# Patient Record
Sex: MALE | Race: OTHER | Employment: OTHER | URBAN - METROPOLITAN AREA
[De-identification: names, ages, dates, MRNs, and addresses within clinical notes are randomized per-mention and may not be internally consistent; named-entity substitution may affect disease eponyms.]

---

## 2018-11-20 LAB
ANION GAP SERPL CALCULATED.3IONS-SCNC: 11 MEQ/L (ref 7–13)
BUN BLDV-MCNC: 22 MG/DL (ref 8–23)
CHLORIDE BLD-SCNC: 104 MEQ/L (ref 98–107)
CHOLESTEROL, TOTAL: 168 MG/DL (ref 0–199)
CO2: 28 MEQ/L (ref 22–29)
CREAT SERPL-MCNC: 1.16 MG/DL (ref 0.7–1.2)
GFR AFRICAN AMERICAN: >60
GFR NON-AFRICAN AMERICAN: >60
HDLC SERPL-MCNC: 50 MG/DL (ref 40–59)
LDL CHOLESTEROL CALCULATED: 89 MG/DL (ref 0–129)
POTASSIUM SERPL-SCNC: 4.4 MEQ/L (ref 3.5–5.1)
SODIUM BLD-SCNC: 143 MEQ/L (ref 132–144)
TOTAL CK: 163 U/L (ref 0–190)
TRIGL SERPL-MCNC: 145 MG/DL (ref 0–200)

## 2019-11-08 LAB
ANION GAP SERPL CALCULATED.3IONS-SCNC: 15 MMOL/L (ref 10–20)
BICARBONATE: 28 MMOL/L (ref 21–32)
CHLORIDE BLD-SCNC: 100 MMOL/L (ref 98–107)
CHOLESTEROL/HDL RATIO: 2.8
CHOLESTEROL: 171 MG/DL (ref 0–199)
CK ISOENZYMES: 94 U/L (ref 0–325)
CREAT SERPL-MCNC: 1.17 MG/DL (ref 0.5–1.3)
GFR AFRICAN AMERICAN: >60 ML/MIN/1.73M2
GFR NON-AFRICAN AMERICAN: >60 ML/MIN/1.73M2
HDLC SERPL-MCNC: 61 MG/DL
LDL CHOLESTEROL: 89 MG/DL (ref 0–99)
POTASSIUM SERPL-SCNC: 4.1 MMOL/L (ref 3.5–5.3)
SODIUM BLD-SCNC: 139 MMOL/L (ref 136–145)
TRIGL SERPL-MCNC: 106 MG/DL (ref 0–149)
UREA NITROGEN: 22 MG/DL (ref 6–23)
VLDLC SERPL CALC-MCNC: 21 MG/DL (ref 0–40)

## 2020-01-13 LAB
CHOLESTEROL/HDL RATIO: 3.5
CHOLESTEROL: 150 MG/DL (ref 0–199)
CK ISOENZYMES: 113 U/L (ref 0–325)
HDLC SERPL-MCNC: 43 MG/DL
LDL CHOLESTEROL: 77 MG/DL (ref 0–99)
TRIGL SERPL-MCNC: 150 MG/DL (ref 0–149)
VLDLC SERPL CALC-MCNC: 30 MG/DL (ref 0–40)

## 2021-05-29 ENCOUNTER — HOSPITAL ENCOUNTER (INPATIENT)
Age: 76
LOS: 1 days | Discharge: ANOTHER HOSPITAL | DRG: 272 | End: 2021-05-29
Attending: EMERGENCY MEDICINE | Admitting: INTERNAL MEDICINE
Payer: MEDICARE

## 2021-05-29 ENCOUNTER — APPOINTMENT (OUTPATIENT)
Dept: CT IMAGING | Age: 76
DRG: 272 | End: 2021-05-29
Payer: MEDICARE

## 2021-05-29 VITALS
HEIGHT: 68 IN | WEIGHT: 178 LBS | SYSTOLIC BLOOD PRESSURE: 125 MMHG | HEART RATE: 60 BPM | DIASTOLIC BLOOD PRESSURE: 58 MMHG | BODY MASS INDEX: 26.98 KG/M2 | RESPIRATION RATE: 9 BRPM | TEMPERATURE: 98.2 F | OXYGEN SATURATION: 100 %

## 2021-05-29 DIAGNOSIS — I21.4 NSTEMI (NON-ST ELEVATED MYOCARDIAL INFARCTION) (HCC): Primary | ICD-10-CM

## 2021-05-29 LAB
ALBUMIN SERPL-MCNC: 4.9 G/DL (ref 3.5–4.6)
ALP BLD-CCNC: 87 U/L (ref 35–104)
ALT SERPL-CCNC: 39 U/L (ref 0–41)
ANION GAP SERPL CALCULATED.3IONS-SCNC: 11 MEQ/L (ref 9–15)
APTT: 26.7 SEC (ref 24.4–36.8)
AST SERPL-CCNC: 41 U/L (ref 0–40)
BASOPHILS ABSOLUTE: 0 K/UL (ref 0–0.2)
BASOPHILS RELATIVE PERCENT: 0.6 %
BILIRUB SERPL-MCNC: 0.7 MG/DL (ref 0.2–0.7)
BUN BLDV-MCNC: 18 MG/DL (ref 8–23)
CALCIUM SERPL-MCNC: 9.6 MG/DL (ref 8.5–9.9)
CHLORIDE BLD-SCNC: 96 MEQ/L (ref 95–107)
CO2: 28 MEQ/L (ref 20–31)
CREAT SERPL-MCNC: 1.03 MG/DL (ref 0.7–1.2)
EOSINOPHILS ABSOLUTE: 0.1 K/UL (ref 0–0.7)
EOSINOPHILS RELATIVE PERCENT: 1 %
GFR AFRICAN AMERICAN: >60
GFR NON-AFRICAN AMERICAN: >60
GLOBULIN: 2.8 G/DL (ref 2.3–3.5)
GLUCOSE BLD-MCNC: 134 MG/DL (ref 70–99)
HCT VFR BLD CALC: 42.7 % (ref 42–52)
HEMOGLOBIN: 14.5 G/DL (ref 14–18)
INR BLD: 1.1
LYMPHOCYTES ABSOLUTE: 1.4 K/UL (ref 1–4.8)
LYMPHOCYTES RELATIVE PERCENT: 23.4 %
MAGNESIUM: 1.9 MG/DL (ref 1.7–2.4)
MCH RBC QN AUTO: 31.4 PG (ref 27–31.3)
MCHC RBC AUTO-ENTMCNC: 33.9 % (ref 33–37)
MCV RBC AUTO: 92.5 FL (ref 80–100)
MONOCYTES ABSOLUTE: 0.6 K/UL (ref 0.2–0.8)
MONOCYTES RELATIVE PERCENT: 9.4 %
NEUTROPHILS ABSOLUTE: 3.9 K/UL (ref 1.4–6.5)
NEUTROPHILS RELATIVE PERCENT: 65.6 %
PDW BLD-RTO: 12.9 % (ref 11.5–14.5)
PLATELET # BLD: 197 K/UL (ref 130–400)
POC CREATININE WHOLE BLOOD: 1.1
POTASSIUM SERPL-SCNC: 3.8 MEQ/L (ref 3.4–4.9)
PROTHROMBIN TIME: 13.8 SEC (ref 12.3–14.9)
RBC # BLD: 4.62 M/UL (ref 4.7–6.1)
SODIUM BLD-SCNC: 135 MEQ/L (ref 135–144)
TOTAL PROTEIN: 7.7 G/DL (ref 6.3–8)
TROPONIN: 0.1 NG/ML (ref 0–0.01)
WBC # BLD: 6 K/UL (ref 4.8–10.8)

## 2021-05-29 PROCEDURE — 2500000003 HC RX 250 WO HCPCS: Performed by: EMERGENCY MEDICINE

## 2021-05-29 PROCEDURE — 6360000002 HC RX W HCPCS: Performed by: INTERNAL MEDICINE

## 2021-05-29 PROCEDURE — 2000000000 HC ICU R&B

## 2021-05-29 PROCEDURE — B41C1ZZ FLUOROSCOPY OF PELVIC ARTERIES USING LOW OSMOLAR CONTRAST: ICD-10-PCS | Performed by: INTERNAL MEDICINE

## 2021-05-29 PROCEDURE — B2111ZZ FLUOROSCOPY OF MULTIPLE CORONARY ARTERIES USING LOW OSMOLAR CONTRAST: ICD-10-PCS | Performed by: INTERNAL MEDICINE

## 2021-05-29 PROCEDURE — 36415 COLL VENOUS BLD VENIPUNCTURE: CPT

## 2021-05-29 PROCEDURE — 2709999900 HC NON-CHARGEABLE SUPPLY

## 2021-05-29 PROCEDURE — C1894 INTRO/SHEATH, NON-LASER: HCPCS

## 2021-05-29 PROCEDURE — C1769 GUIDE WIRE: HCPCS

## 2021-05-29 PROCEDURE — 6360000004 HC RX CONTRAST MEDICATION: Performed by: EMERGENCY MEDICINE

## 2021-05-29 PROCEDURE — C1887 CATHETER, GUIDING: HCPCS

## 2021-05-29 PROCEDURE — 2580000003 HC RX 258

## 2021-05-29 PROCEDURE — C1889 IMPLANT/INSERT DEVICE, NOC: HCPCS

## 2021-05-29 PROCEDURE — 83735 ASSAY OF MAGNESIUM: CPT

## 2021-05-29 PROCEDURE — 96375 TX/PRO/DX INJ NEW DRUG ADDON: CPT

## 2021-05-29 PROCEDURE — 85730 THROMBOPLASTIN TIME PARTIAL: CPT

## 2021-05-29 PROCEDURE — 84484 ASSAY OF TROPONIN QUANT: CPT

## 2021-05-29 PROCEDURE — B41F1ZZ FLUOROSCOPY OF RIGHT LOWER EXTREMITY ARTERIES USING LOW OSMOLAR CONTRAST: ICD-10-PCS | Performed by: INTERNAL MEDICINE

## 2021-05-29 PROCEDURE — 92920 PRQ TRLUML C ANGIOP 1ART&/BR: CPT | Performed by: INTERNAL MEDICINE

## 2021-05-29 PROCEDURE — 6370000000 HC RX 637 (ALT 250 FOR IP): Performed by: EMERGENCY MEDICINE

## 2021-05-29 PROCEDURE — 6360000004 HC RX CONTRAST MEDICATION: Performed by: INTERNAL MEDICINE

## 2021-05-29 PROCEDURE — 4A023N7 MEASUREMENT OF CARDIAC SAMPLING AND PRESSURE, LEFT HEART, PERCUTANEOUS APPROACH: ICD-10-PCS | Performed by: INTERNAL MEDICINE

## 2021-05-29 PROCEDURE — 33967 INSERT I-AORT PERCUT DEVICE: CPT | Performed by: INTERNAL MEDICINE

## 2021-05-29 PROCEDURE — 96374 THER/PROPH/DIAG INJ IV PUSH: CPT

## 2021-05-29 PROCEDURE — 5A02210 ASSISTANCE WITH CARDIAC OUTPUT USING BALLOON PUMP, CONTINUOUS: ICD-10-PCS | Performed by: INTERNAL MEDICINE

## 2021-05-29 PROCEDURE — B4101ZZ FLUOROSCOPY OF ABDOMINAL AORTA USING LOW OSMOLAR CONTRAST: ICD-10-PCS | Performed by: INTERNAL MEDICINE

## 2021-05-29 PROCEDURE — 2580000003 HC RX 258: Performed by: EMERGENCY MEDICINE

## 2021-05-29 PROCEDURE — 85025 COMPLETE CBC W/AUTO DIFF WBC: CPT

## 2021-05-29 PROCEDURE — 80053 COMPREHEN METABOLIC PANEL: CPT

## 2021-05-29 PROCEDURE — 6360000002 HC RX W HCPCS

## 2021-05-29 PROCEDURE — B2151ZZ FLUOROSCOPY OF LEFT HEART USING LOW OSMOLAR CONTRAST: ICD-10-PCS | Performed by: INTERNAL MEDICINE

## 2021-05-29 PROCEDURE — 99285 EMERGENCY DEPT VISIT HI MDM: CPT

## 2021-05-29 PROCEDURE — 6360000002 HC RX W HCPCS: Performed by: EMERGENCY MEDICINE

## 2021-05-29 PROCEDURE — 85610 PROTHROMBIN TIME: CPT

## 2021-05-29 PROCEDURE — 93005 ELECTROCARDIOGRAM TRACING: CPT

## 2021-05-29 PROCEDURE — 93458 L HRT ARTERY/VENTRICLE ANGIO: CPT | Performed by: INTERNAL MEDICINE

## 2021-05-29 PROCEDURE — C1725 CATH, TRANSLUMIN NON-LASER: HCPCS

## 2021-05-29 PROCEDURE — 96372 THER/PROPH/DIAG INJ SC/IM: CPT

## 2021-05-29 PROCEDURE — 71275 CT ANGIOGRAPHY CHEST: CPT

## 2021-05-29 RX ORDER — SODIUM CHLORIDE 0.9 % (FLUSH) 0.9 %
5-40 SYRINGE (ML) INJECTION EVERY 12 HOURS SCHEDULED
Status: DISCONTINUED | OUTPATIENT
Start: 2021-05-29 | End: 2021-05-29 | Stop reason: HOSPADM

## 2021-05-29 RX ORDER — 0.9 % SODIUM CHLORIDE 0.9 %
1000 INTRAVENOUS SOLUTION INTRAVENOUS ONCE
Status: COMPLETED | OUTPATIENT
Start: 2021-05-29 | End: 2021-05-29

## 2021-05-29 RX ORDER — SODIUM CHLORIDE 9 MG/ML
25 INJECTION, SOLUTION INTRAVENOUS PRN
Status: DISCONTINUED | OUTPATIENT
Start: 2021-05-29 | End: 2021-05-29 | Stop reason: HOSPADM

## 2021-05-29 RX ORDER — ATORVASTATIN CALCIUM 80 MG/1
80 TABLET, FILM COATED ORAL NIGHTLY
Status: DISCONTINUED | OUTPATIENT
Start: 2021-05-29 | End: 2021-05-29 | Stop reason: SDUPTHER

## 2021-05-29 RX ORDER — SODIUM CHLORIDE 0.9 % (FLUSH) 0.9 %
5-40 SYRINGE (ML) INJECTION PRN
Status: DISCONTINUED | OUTPATIENT
Start: 2021-05-29 | End: 2021-05-29 | Stop reason: HOSPADM

## 2021-05-29 RX ORDER — HYDRALAZINE HYDROCHLORIDE 20 MG/ML
10 INJECTION INTRAMUSCULAR; INTRAVENOUS EVERY 10 MIN PRN
Status: DISCONTINUED | OUTPATIENT
Start: 2021-05-29 | End: 2021-05-29 | Stop reason: HOSPADM

## 2021-05-29 RX ORDER — ATROPINE SULFATE 0.4 MG/ML
0.6 AMPUL (ML) INJECTION
Status: DISCONTINUED | OUTPATIENT
Start: 2021-05-29 | End: 2021-05-29 | Stop reason: HOSPADM

## 2021-05-29 RX ORDER — ASPIRIN 81 MG/1
324 TABLET, CHEWABLE ORAL ONCE
Status: COMPLETED | OUTPATIENT
Start: 2021-05-29 | End: 2021-05-29

## 2021-05-29 RX ORDER — ACETAMINOPHEN 650 MG/1
650 SUPPOSITORY RECTAL EVERY 6 HOURS PRN
Status: DISCONTINUED | OUTPATIENT
Start: 2021-05-29 | End: 2021-05-29 | Stop reason: HOSPADM

## 2021-05-29 RX ORDER — SODIUM CHLORIDE 9 MG/ML
INJECTION, SOLUTION INTRAVENOUS CONTINUOUS
Status: DISCONTINUED | OUTPATIENT
Start: 2021-05-29 | End: 2021-05-29 | Stop reason: HOSPADM

## 2021-05-29 RX ORDER — ONDANSETRON 2 MG/ML
4 INJECTION INTRAMUSCULAR; INTRAVENOUS ONCE
Status: COMPLETED | OUTPATIENT
Start: 2021-05-29 | End: 2021-05-29

## 2021-05-29 RX ORDER — ACETAMINOPHEN 325 MG/1
650 TABLET ORAL EVERY 4 HOURS PRN
Status: DISCONTINUED | OUTPATIENT
Start: 2021-05-29 | End: 2021-05-29 | Stop reason: SDUPTHER

## 2021-05-29 RX ORDER — ONDANSETRON 2 MG/ML
4 INJECTION INTRAMUSCULAR; INTRAVENOUS EVERY 6 HOURS PRN
Status: DISCONTINUED | OUTPATIENT
Start: 2021-05-29 | End: 2021-05-29 | Stop reason: SDUPTHER

## 2021-05-29 RX ORDER — MORPHINE SULFATE 2 MG/ML
4 INJECTION, SOLUTION INTRAMUSCULAR; INTRAVENOUS
Status: COMPLETED | OUTPATIENT
Start: 2021-05-29 | End: 2021-05-29

## 2021-05-29 RX ORDER — SODIUM CHLORIDE 9 MG/ML
INJECTION, SOLUTION INTRAVENOUS
Status: COMPLETED
Start: 2021-05-29 | End: 2021-05-29

## 2021-05-29 RX ORDER — ASPIRIN 81 MG/1
81 TABLET, CHEWABLE ORAL DAILY
Status: DISCONTINUED | OUTPATIENT
Start: 2021-05-30 | End: 2021-05-29 | Stop reason: HOSPADM

## 2021-05-29 RX ORDER — ASPIRIN 81 MG/1
81 TABLET ORAL DAILY
Status: DISCONTINUED | OUTPATIENT
Start: 2021-05-30 | End: 2021-05-29 | Stop reason: SDUPTHER

## 2021-05-29 RX ORDER — HEPARIN SODIUM 10000 [USP'U]/100ML
6 INJECTION, SOLUTION INTRAVENOUS CONTINUOUS
Status: DISCONTINUED | OUTPATIENT
Start: 2021-05-29 | End: 2021-05-29 | Stop reason: HOSPADM

## 2021-05-29 RX ORDER — ONDANSETRON 2 MG/ML
4 INJECTION INTRAMUSCULAR; INTRAVENOUS EVERY 6 HOURS PRN
Status: DISCONTINUED | OUTPATIENT
Start: 2021-05-29 | End: 2021-05-29 | Stop reason: HOSPADM

## 2021-05-29 RX ORDER — POLYETHYLENE GLYCOL 3350 17 G/17G
17 POWDER, FOR SOLUTION ORAL DAILY PRN
Status: DISCONTINUED | OUTPATIENT
Start: 2021-05-29 | End: 2021-05-29 | Stop reason: HOSPADM

## 2021-05-29 RX ORDER — SODIUM CHLORIDE 9 MG/ML
10 INJECTION INTRAVENOUS DAILY
Status: DISCONTINUED | OUTPATIENT
Start: 2021-05-29 | End: 2021-05-29 | Stop reason: HOSPADM

## 2021-05-29 RX ORDER — TAMSULOSIN HYDROCHLORIDE 0.4 MG/1
0.4 CAPSULE ORAL DAILY
Status: DISCONTINUED | OUTPATIENT
Start: 2021-05-29 | End: 2021-05-29 | Stop reason: HOSPADM

## 2021-05-29 RX ORDER — KETOROLAC TROMETHAMINE 15 MG/ML
15 INJECTION, SOLUTION INTRAMUSCULAR; INTRAVENOUS ONCE
Status: COMPLETED | OUTPATIENT
Start: 2021-05-29 | End: 2021-05-29

## 2021-05-29 RX ORDER — 0.9 % SODIUM CHLORIDE 0.9 %
500 INTRAVENOUS SOLUTION INTRAVENOUS ONCE
Status: DISCONTINUED | OUTPATIENT
Start: 2021-05-29 | End: 2021-05-29 | Stop reason: HOSPADM

## 2021-05-29 RX ORDER — PANTOPRAZOLE SODIUM 40 MG/10ML
40 INJECTION, POWDER, LYOPHILIZED, FOR SOLUTION INTRAVENOUS DAILY
Status: DISCONTINUED | OUTPATIENT
Start: 2021-05-29 | End: 2021-05-29 | Stop reason: HOSPADM

## 2021-05-29 RX ORDER — ACETAMINOPHEN 325 MG/1
650 TABLET ORAL EVERY 6 HOURS PRN
Status: DISCONTINUED | OUTPATIENT
Start: 2021-05-29 | End: 2021-05-29 | Stop reason: HOSPADM

## 2021-05-29 RX ORDER — PROMETHAZINE HYDROCHLORIDE 12.5 MG/1
12.5 TABLET ORAL EVERY 6 HOURS PRN
Status: DISCONTINUED | OUTPATIENT
Start: 2021-05-29 | End: 2021-05-29 | Stop reason: HOSPADM

## 2021-05-29 RX ORDER — NITROGLYCERIN 20 MG/100ML
5-200 INJECTION INTRAVENOUS CONTINUOUS
Status: DISCONTINUED | OUTPATIENT
Start: 2021-05-29 | End: 2021-05-29 | Stop reason: HOSPADM

## 2021-05-29 RX ORDER — ATORVASTATIN CALCIUM 80 MG/1
80 TABLET, FILM COATED ORAL NIGHTLY
Status: DISCONTINUED | OUTPATIENT
Start: 2021-05-29 | End: 2021-05-29 | Stop reason: HOSPADM

## 2021-05-29 RX ADMIN — MORPHINE SULFATE 4 MG: 2 INJECTION, SOLUTION INTRAMUSCULAR; INTRAVENOUS at 10:21

## 2021-05-29 RX ADMIN — ASPIRIN 324 MG: 81 TABLET, CHEWABLE ORAL at 10:21

## 2021-05-29 RX ADMIN — ONDANSETRON 4 MG: 2 INJECTION INTRAMUSCULAR; INTRAVENOUS at 09:38

## 2021-05-29 RX ADMIN — HEPARIN SODIUM AND DEXTROSE 500 UNITS/HR: 10000; 5 INJECTION INTRAVENOUS at 16:39

## 2021-05-29 RX ADMIN — SODIUM CHLORIDE: 9 INJECTION, SOLUTION INTRAVENOUS at 16:38

## 2021-05-29 RX ADMIN — SODIUM CHLORIDE 1000 ML: 9 INJECTION, SOLUTION INTRAVENOUS at 11:22

## 2021-05-29 RX ADMIN — KETOROLAC TROMETHAMINE 15 MG: 15 INJECTION, SOLUTION INTRAMUSCULAR; INTRAVENOUS at 09:38

## 2021-05-29 RX ADMIN — NITROGLYCERIN 30 MCG/MIN: 20 INJECTION INTRAVENOUS at 11:43

## 2021-05-29 RX ADMIN — IOPAMIDOL 340 ML: 612 INJECTION, SOLUTION INTRAVENOUS at 14:32

## 2021-05-29 RX ADMIN — ENOXAPARIN SODIUM 80 MG: 80 INJECTION SUBCUTANEOUS at 10:21

## 2021-05-29 RX ADMIN — SODIUM CHLORIDE 1000 ML: 9 INJECTION, SOLUTION INTRAVENOUS at 09:38

## 2021-05-29 RX ADMIN — IOPAMIDOL 100 ML: 755 INJECTION, SOLUTION INTRAVENOUS at 10:14

## 2021-05-29 RX ADMIN — MORPHINE SULFATE 4 MG: 2 INJECTION, SOLUTION INTRAMUSCULAR; INTRAVENOUS at 16:35

## 2021-05-29 ASSESSMENT — PAIN DESCRIPTION - DIRECTION: RADIATING_TOWARDS: LEFT ARM

## 2021-05-29 ASSESSMENT — PAIN SCALES - GENERAL
PAINLEVEL_OUTOF10: 5
PAINLEVEL_OUTOF10: 4
PAINLEVEL_OUTOF10: 2
PAINLEVEL_OUTOF10: 5

## 2021-05-29 ASSESSMENT — PAIN DESCRIPTION - ORIENTATION
ORIENTATION: LEFT
ORIENTATION: LEFT
ORIENTATION: LEFT;RIGHT
ORIENTATION: LEFT

## 2021-05-29 ASSESSMENT — PAIN DESCRIPTION - LOCATION
LOCATION: CHEST;ARM
LOCATION: CHEST

## 2021-05-29 ASSESSMENT — PAIN DESCRIPTION - FREQUENCY
FREQUENCY: INTERMITTENT
FREQUENCY: CONTINUOUS
FREQUENCY: CONTINUOUS

## 2021-05-29 ASSESSMENT — ENCOUNTER SYMPTOMS
DIARRHEA: 0
NAUSEA: 0
BACK PAIN: 0
SORE THROAT: 0
COUGH: 0
VOMITING: 0
ABDOMINAL PAIN: 0
SHORTNESS OF BREATH: 0

## 2021-05-29 ASSESSMENT — PAIN DESCRIPTION - PAIN TYPE
TYPE: ACUTE PAIN

## 2021-05-29 ASSESSMENT — PAIN DESCRIPTION - DESCRIPTORS
DESCRIPTORS: BURNING
DESCRIPTORS: BURNING;CRUSHING

## 2021-05-29 ASSESSMENT — PAIN DESCRIPTION - ONSET: ONSET: ON-GOING

## 2021-05-29 NOTE — PROGRESS NOTES
I reviewed the notes by Steven Tong RN. I disagree with some of the aspects regarding the timing and details  that she has on her note dated 5/29/2021-5 :25 pm.    The first message that I received   call the ICU was timed 4:03 PM that was the first message I have on my phone and I have it. Do not know how anyone tried to reach me before that . I am told that when the  Ana Frederick ( who I spoke to )asked the  to page me she was told that I was not on call. This is a confusing issue because the  does not have me on call for cardiology but I am on interventional call for Dr. Nick Paredes. I do not know what time lag happened as a result. I was not told that the patient was having chest pain. The patient was comfortable in bed, hemodynamically stable. I did not just return the call I came back to see the patient in his room. I have been on constant communication with the transfer line regarding this patient's transfer. Please do not document without verifying what happened . Also if there was any issues regarding getting a hold of me please inform me at the right time i.e. when I was in the room.

## 2021-05-29 NOTE — PROGRESS NOTES
Spiritual Care Services     Summary of Visit:  Responded to rapid response. Patient being transferred to [facility]. Family in the waiting room, came in one at a time to see their loved one, provided prayer. Patient expressed uncertainty about undergoing surgery. Patent and family open to talking with care team at [facility] and to their recommendations, transfer proceeding. Spiritual Assessment/Intervention/Outcomes:    Encounter Summary  Services provided to[de-identified] Patient and family together  Referral/Consult From[de-identified] Multi-disciplinary team  Support System: Spouse, Children, Family members, Yazidi/nadia community  Continue Visiting: No  Complexity of Encounter: Moderate  Length of Encounter: 45 minutes  Spiritual Assessment Completed: Yes     Crisis  Type: Rapid response  Assessment: Approachable, Anxious  Intervention: Prayer, Explored coping resources, Sustaining presence/ Ministry of presence, Mediation  Outcome: Expressed gratitude, Coping           Advance Directives (For Healthcare)  Pre-existing DNR Comfort Care/DNR Arrest/DNI Order: No                Care Plan:    No follow-up    Spiritual Care Services   Electronically signed by Alan Han on 5/29/21 at 5:23 PM EDT     To reach a  for emotional and spiritual support, place [facility].   If a  is needed immediately, dial 0 and ask to page the on-call .

## 2021-05-29 NOTE — CODE DOCUMENTATION
Transfer line was contacted by nursing supervisor to attempt to expedite transfer to [facility]. EKG shows STEMI with increase in ST elevation and rapid was called because RN could not initially get ahold of Dr. Lisandro Spears who is here in room now discussing plan of care with RN.

## 2021-05-29 NOTE — OP NOTE
INDICATIONS:  The patient is a 67 y.o. male presented with unstable angina pectoris, borderline blood pressure with IV nitroglycerin, persistent chest discomfort, initial troponin was elevated, plan it was felt that patient needed emergent cardiac catheterization and possible intervention. PROCEDURE:  Left heart catheterization, coronary angiography, left ventriculography ,LV/Ao pull back and selective right common femoral angiography was performed. Thoracic and abdominal aortography and aortoiliac angiography was performed. PCI of the proximal mid RCA was attempted, but was unsuccessful. Intra-aortic balloon pump was placed. Patient was transferred to the cardiac intensive care unit after multiple phone calls were made with the transfer center and cardiothoracic surgery  [facility] and [facility], with plans to transfer patient to [facility]. Benefits, alternatives and risks of the procedure were explained to the patient to include but not limited to MI, Stroke, Death, Allergic reaction to dye, bleeding, risk of kidney injury, and possible need for emergent coronary artery bypass grafting and informed consent was obtained. The patient was brought to the cath lab and was prepped and draped in the normal sterile technique. IV conscious sedation was maintained. 1% lidocaine was used for local anesthesia. DESCRIPTION OF PROCEDURE: Under local anesthesia, a 5-Austrian short sheath was placed in the right common femoral artery by single anterior percutaneous stick. Selective right common femoral angiography showed that the access was in the right common femoral artery above its bifurcation. A 5-Austrian JL4 diagnostic catheter was advanced over a 0.035 guidewire and the left main coronary artery was selectively engaged. Angiography of the left system was performed in multiple orthogonal views. The 5-Austrian JL4 diagnostic catheter and the guidewire were removed.  A 5-Austrian 3DRC diagnostic catheter was advanced over a 0.035 guidewire and the right coronary artery was selectively engaged. Angiography of the right coronary artery was performed in multiple orthogonal views. The 5-Nepalese [facility] diagnostic catheter and guidewire were removed. A 5-Nepalese angled pigtail catheter was advanced over a 0.035 guidewire across the aortic valve into the left ventricle. Left ventricular end-diastolic pressure was measured. Left ventriculography was performed in about 30° KC view. Slow manual pullback was performed across the aortic valve. At the conclusion of the procedure, it was decided to proceed with PCI and stenting of the proximal mid RCA artery. The existing 5 Nepalese right femoral artery sheath was changed to a 6 Nepalese 25cm  sheath. Heparin was administered and ACT was documented. A 6 [place] ARmod  guide catheter was advanced over a 0.035 guidewire and the Right coronary artery was selectively engaged. A 0.014 180cm Run through wire was  advanced and the lesion was crossed without difficulty. A 2.25x12  mm Mederos Trek balloon was advanced over the wire, but but would not make it past the proximal bend in the RCA, and there was moderate calcification of the segment, the balloon was inflated at the band in the vessel to 8 to 10 johnathon. The balloon was removed. A 2.0 x 8 mm Abbott trek balloon was then used, again unsuccessful in reaching the lesion, and was removed. Transient decrease in antegrade flow was treated with intracoronary nicardipine and nitroglycerin, patient's chest discomfort persisted, a 1.5 mm balloon was then advanced, but again would not make it past the proximal bend and calcification to the lesion that was 99% in severity. The balloon was removed. The AR-1 guide catheter was removed. A 6 Nepalese [facility] guide catheter was then advanced. A 0.014 180 cm run-through wire was advanced into the proximal RCA, but would not traverse the mid RCA.   A guide liner catheter was advanced into the proximal RCA, the guidewire prolapsed and was removed. At this point, there was DEVIN-3 antegrade flow, and additional attempts were not made. It was felt that this patient would be a reasonable candidate for coronary artery bypass grafting, and appropriate phone calls were made, which are as listed in a separate note. Due to persistent chest discomfort and ST segment elevation on the monitor, intra-aortic balloon pump was placed, with one-to-one counterpulsation. Messina catheter was placed and close to 500 cc clear urine was drained. Bradycardia was treated with atropine and low-dose dopamine infusion. Morphine was used for pain relief. When patient left the cardiac catheterization laboratory, augmented blood pressure was in the low 100 range, heart rate was in the 60s, oxygen saturation was close to 100%, patient was laying flat in bed, continues to have low level chest discomfort, but overall appeared  Comfortable. Patient's wife and daughter were updated, they came to the Cath Lab to see the patient, I reviewed the films with them, I had done the prior intervention several years ago on his LAD, the rationale for transfer and the recommendation for coronary artery bypass grafting were reviewed with him. HEMODYNAMIC / ANGIOGRAPHIC DATA:    1. Left ventricular end diastolic pressure was 12 mmHg. No systolic gradient across the aortic valve. 2. Left ventriculography revealed an EF around 50%. Mild to moderate basal inferior hypokinesis was appreciated. Mitral regurgitation was not appreciated. 3. The left main coronary artery  has 20% distal tapering, arises from the left sinus of Valsalva and bifurcates into the left anterior descending artery and the left circumflex artery. 4. The left anterior descending artery has a long segment of stenting in the proximal and mid segments, stents are patent with about 40% diffuse disease within the stented segment.   Some of the segments in the left anterior descending artery have 2 layers of radiopaque stents. Distal mid left anterior descending artery distal to the stent has 75% smooth tubular stenosis spanning about 12 mm. The distal LAD after that is of relatively small caliber. A 2.25mm  diagonal branch originates from the distal left anterior descending artery, bifurcates into 2 secondary branches, and has 80% stenosis prior to the bifurcation. 5. The left circumflex is nondominant, there is 20% proximal stenosis, there is a radiopaque stent in the AV groove which is patent. The first major obtuse marginal branch measures about 2.5 mm and has less than 20% stenosis. After that there is 80% stenosis in the AV groove left circumflex artery followed by a 70% stenosis, and then the left circumflex artery terminates in a bifurcating moderate-sized obtuse marginal branch. 6. The right coronary artery is calcified, with 99% stenosis in the proximal mid segment, with the haziness raising possibility of thrombus. DEVIN II plus distal flow. Felt to be the culprit lesion based on its angiographic appearance. PCI/stenting of the subtotal occlusion in the proximal mid right coronary artery was unsuccessful because the balloon would not reach the segment of stenosis. At the conclusion of the procedure there was a residual 95% stenosis, with DEVIN-2+/3 flow. 7.  Aortoiliac angiography did not reveal any abdominal aortic aneurysm, there was mild diffuse disease, right common femoral angiography showed that the access was above the common femoral bifurcation. Intra-aortic balloon pump was positioned under fluoroscopy, excellent waveforms, position was verified under fluoroscopy, balloon inflation was confirmed, sheath was secured in place, the conclusion of the procedure the left radial and brachial pulses were strong to palpation distal pulses were also palpable.       RECOMMENDATIONS: Multiple phone calls are being made to the transfer center [facility] for definitive care.     Maria Luisa Oakes MD

## 2021-05-29 NOTE — ED NOTES
Decreased Nitro drip 40 Mcg/min per Dr. Jonatan Martinez 95/63.       Ulises Saldivar RN  05/29/21 5316

## 2021-05-29 NOTE — ED NOTES
EKG completed and given to Dr Wolfgang Ramirez. IV placed and blood collected. Patient on bedside monitor. Blood sent to lab.       Chastity Conrad, RN  05/29/21 0421

## 2021-05-29 NOTE — H&P
Hospital Medicine  History and Physical    Patient:  Ariel Melgar  MRN: [id number]    CHIEF COMPLAINT:    Chief Complaint   Patient presents with    Chest Pain     pt c/o chest pain/burning that radiates into his LUE since yesterday       History Obtained From:  Patient, EMR  Primary Care Physician: Taylor Hou MD    HISTORY OF PRESENT ILLNESS:   The patient is a 67 y.o. male who presents with chest pain. Chest pain started earlier today. Radiating to the left arm. Duration: Hours. Timing: Gradually worsening. No aggravating factors. Relieved by nitroglycerin. Not associated with fever. Patient states that the nitroglycerin drip has increased in dose, his pain has started to decrease. However the pain which was a 5 out of 10 has come down to a 2 out of 10. It has not completely resolved. Left arm pain has almost completely resolved. Past Medical History:      Diagnosis Date    BPH (benign prostatic hyperplasia)     ED (erectile dysfunction)     Hematuria     History of heart attack        Past Surgical History:      Procedure Laterality Date    CARDIAC CATHETERIZATION  2011    6 STENTS    FOOT SURGERY      HERNIA REPAIR      NOSE SURGERY      TONSILLECTOMY         Medications Prior to Admission:    Prior to Admission medications    Medication Sig Start Date End Date Taking? Authorizing Provider   metFORMIN (GLUCOPHAGE) 500 MG tablet  10/30/13   Historical Provider, MD Tello Alanis 8 MG TB24  10/4/13   Historical Provider, MD   tamsulosin (FLOMAX) 0.4 MG capsule  10/4/13   Historical Provider, MD   tadalafil (CIALIS) 20 MG tablet Take 1 tablet by mouth daily. 12/23/13   Diego Howell MD       Allergies:  Pcn [penicillins]    Social History:   TOBACCO:   reports that he has never smoked. He has never used smokeless tobacco.  ETOH:   reports no history of alcohol use. Family History:   History reviewed. No pertinent family history.     REVIEW OF SYSTEMS:  Ten systems reviewed and negative except as stated in the HPI    Physical Exam:    Vitals: BP (!) 125/58   Pulse 69   Temp 98.2 °F (36.8 °C) (Oral)   Resp 22   Ht 5' 8\" (1.727 m)   Wt 178 lb (80.7 kg)   SpO2 100%   BMI 27.06 kg/m²   General appearance: alert, appears stated age and cooperative  Skin: Skin color, texture, turgor normal. No rashes or lesions  HEENT: Head: Normocephalic, no lesions, without obvious abnormality. . No dental issues   Neck: no jugular venous distention  Lungs: clear to auscultation bilaterally  Heart: regular rate and rhythm, S1, S2 normal, no murmur, click, rub or gallop  Abdomen: soft, non-tender; bowel sounds normal; no masses,  no organomegaly  Extremities: extremities normal, atraumatic, no cyanosis or edema  Neurologic: Mental status: Alert, oriented, thought content appropriate. Recent Labs     05/29/21  0930   WBC 6.0   HGB 14.5        Recent Labs     05/29/21  0930      K 3.8   CL 96   CO2 28   BUN 18   CREATININE 1.03   GLUCOSE 134*   AST 41*   ALT 39   BILITOT 0.7   ALKPHOS 87     Troponin T:   Recent Labs     05/29/21  0930   TROPONINI 0.097*       ABGs: No results found for: PHART, PO2ART, [id number]  INR:   Recent Labs     05/29/21  0938   INR 1.1     URINALYSIS:No results for input(s): NITRITE, COLORU, PHUR, LABCAST, WBCUA, RBCUA, MUCUS, TRICHOMONAS, YEAST, BACTERIA, CLARITYU, SPECGRAV, LEUKOCYTESUR, UROBILINOGEN, BILIRUBINUR, BLOODU, GLUCOSEU, AMORPHOUS in the last 72 hours. Invalid input(s): Fernando Mitchell  -----------------------------------------------------------------   CTA Chest W WO  (PE study)    Result Date: 5/29/2021  CT PULMONARY ANGIOGRAM WITH INTRAVENOUS CONTRAST MEDIUM. REASON FOR EXAMINATION:  CHEST PAIN TECHNIQUE: Helical CTA was performed through the chest utilizing 100 ml of Isovue-370 intravenous contrast.  Images were obtained with bolus tracking in order to opacify the pulmonary arteries.   Thick section coronal MIP 3D reconstructions were performed  on a separate Multiple                         Low risk**                <6 mm     No routine follow-up               6-8 mm     CT at 3-6 months, then consider CT at 18-24 months                >8 mm     CT at 3-6 months, then  consider CT at 18-24 months Use most suspicious nodule as guide to management. Follow-up intervals may vary according to size and risk (recommendation 2A). High risk**                <6 mm     Optional CT at 12 months               6-8 mm     CT at 3-6 months, then at 18-24 months                >8 mm     CT at 3-6 months, then at 18-24 months Use most suspicious nodule as guide to management. Follow-up intervals may vary according to size and risk (recommendation 2A). B: Subsolid Nodules*      Single                  Ground glass                <6 mm     No routine follow-up              >=6 mm     CT at 6-12 months to confirm persistence, then CT  every 2 years until 5 years In certain suspicious nodules <6 mm, consider follow-up at 2 and 4 years. If solid component(s) or growth develops, consider resection. (Recommendations 3A and 4A). Part solid                <6 mm     No routine follow-up              >=6 mm     CT at 3-6 months to confirm persistence. If unchanged and solid component remains <6 mm, annual CT  should be performed for 5 years. In practice, part-solid nodules cannot be defined as such until >=6 mm, and nodules <6 mm do not usually require follow-up. Persistent part-solid nodules with solid components >=6 mm should be considered highly suspicious (recommendations 4A-4C)      Multiple                <6 mm     CT at 3-6 months. If stable, consider CT at 2 and 4 years. >=6 mm     CT at 3-6 months. Subsequent management based  on the most suspicious nodule(s). Multiple <6 mm pure ground-glass nodules are usually benign, but consider follow-up in selected patients at high risk at 2 and 4 years (recommendation 5A). Note. --These recommendations do not apply to lung cancer screening, patients with immunosuppression, or patients with known primary cancer. * Dimensions are average of long and short axes, rounded to the nearest millimeter. ** Consider all relevant risk factors (see Risk Factors). ____________________________________________________________ All CT scans at this facility use dose modulation, iterative reconstruction, and/or weight based dosing when appropriate to reduce radiation dose to as low as reasonably achievable. Assessment and Plan   1. ACS/unstable angina: Unable to resolve pain with conservative management/medication. Cath Lab activated, cardiology interventionalist on the way  2. CAD: History of multiple stents in the past.  3. Diabetes: Monitor glucose accordion. Adjust medications as needed  4. BPH: On tamsulosin at home  5. DVT ppx  6. Disposition: Dependent on hospital course. Will discharge once medically stable. SW on board for discharge planning.      Patient Active Problem List   Diagnosis Code    [facility]STEMI (non-ST elevated [facility]) [facility] I21.4       Cherise Salvador MD, MD

## 2021-05-29 NOTE — ED NOTES
Titrated medication per Dr. Mendoza Salinas recommendation. 1025- Increased to 30mcg/min per Dr. Mendoza Salinas. /72  1051- Increased to 40mcg/min /71  1058- Increased to 50 mcg/min /72     Sissy Polanco RN  05/29/21 110

## 2021-05-29 NOTE — FLOWSHEET NOTE
Received patient from cath lab at  per [facility]. [facility] RN had called earlier to speak to this RN prior to arrival.     Patient on IABP 1:1 setting. Via the R fem- site negative. Soft. No hematoma or discoloration. IABP sutured and tegadermed to site. Palpable pulse in the R foot and R Fem. Skin warm and appropriate in color- matches L foot/leg. Patient aware to keep R leg straight. Patient perfusing- BP stable. Alert and oriented. Patient on 2 L NC for cardiac care and support. 2x PIVs- dopamine infusing in R FA at 10 mcg/kg- started in cath lab for increasing heart rate for s. Tran and cardiac output. NACL at 300 cc/hr. Decreased to 200 cc/hr to Dr Ezra Hinojosa order. Patient resting on arrival. VSS. Patient Sinus tran on arrival to ICU. No complaints of pain verbally/non verbally. Within 15 minutes of arrival, patient's heart rate got tachycardic and began to hit the 120's. BP elevated. EKG shows ST elevation MI. Ono placed a telephone call placed to Dr Cherrie Painting as she was the physician who did the heart cath and no other consults at this time. Patient otherwise stable. Continued to monitor. Patient around 1600 began to verbally c/o chest radiating to L arm. Around 1602 RN called a rapid response due to increasing ST elevation. Around 1605 Dr Cherrie Painting called. Clarified orders with her over the phone- Per Dr Cherrie Painting, Patient transfer needs to [facility] exped[facility]. [facility] supervisor here- call placed by him to transfer center. Updated the transfer center on patients condition. Around 80- Dr Cherrie Painting arrived. Showed her the EKGs and the ST elevation. Aware. No new orders. Was told to update her once the patient received a bed at [facility]. Discussed ECHO, per Dr Cherrie Painting, it does not have to be completed at this time. Medicated the patient at [id number] with PRN morphine for the chest pain that the patient was experiencing. Per the patient, it was effective. ACT ran- 191.  Per Dr Ezra Hinojosa order, started

## 2021-05-29 NOTE — PROGRESS NOTES
Patient underwent emergent left heart catheterization coronary angiography selective right common femoral angiography left ventriculography, LV ao pullback, balloon angioplasty of the proximal mid RCA was attempted, patient then underwent ascending aortography and aortoiliac angiography, intra-aortic balloon pump was placed with one-to-one counterpulsation. Findings:  1. Triple-vessel coronary artery disease  2. Large dominant RCA with 99% hazy proximal mid stenosis, diffuse calcification. 3.  Unable to advance balloons across the calcified segments in the RCA in order to effectively dilate the lesion  4. Patent previously deployed stents in the proximal and mid LAD. Distal mid LAD with 75% stenosis  5. Left circumflex artery with 80% distal stenosis prior to relatively large terminal obtuse marginal branch  6. LVEDP 10 mmHg no systolic gradient across the aortic valve  7. LV ejection fraction about 50% with mild basal and mid inferior hypokinesis no appreciable mitral regurgitation    Multiple phone calls were made to CT surgery Dr. Deshawn Chance at [address], with initial plans for transfer to patient to the ICU at [address], then gave report to Dr. Orlin Vasquez in the ICU, discussed again with Dr. Deshawn Chance, and given the critical nature, he felt the patient is best served at [address] [place] [address], I have called the transfer line on multiple occasions, I have spoken with Dr. [facility] the attending who is receiving the patient at [facility], please call started around 2 PM, or earlier, as of 4:35 PM, I am told that [facility] should be here within 30 minutes to take patient. I then had an urgent call to the CCU regarding ST elevation on patient's EKG, came back to see patient, patient does have significant inferior ST elevation and anterior ST depression, and type I second-degree AV block.   He is having a great waveform on the intra-aortic balloon pump, with augmented pressure in excess of 100, he is making good urine, he is sleeping, and appears comfortable. I did not wake him up. At this point, we are awaiting transfer for revascularization. Total time coordinating care, discussing with Dr. Deandra Nieto, calling transfer center, updating the cardiothoracic unit [facility], updating family members and reviewing case with them, and reevaluating patient in the ICU ( outside of procedure) was 2 hours and 30 minutes today. Unable to use beta-blockers because of borderline blood pressure and type I second-degree AV block, patient also on dopamine for heart rate. Unable to use IV nitroglycerin because of borderline blood pressure, and patient on dopamine drip for blood pressure and heart rate. Not given dual antiplatelet therapy because patient has surgical disease, and at this point my understanding is that patient will go for emergent coronary artery bypass grafting. We will start IV heparin, low-dose, as patient got Lovenox in the emergency room full dose this morning.

## 2021-05-29 NOTE — ED PROVIDER NOTES
[facility] ED  eMERGENCYdEPARTMENT eNCOUnter      Pt Name: Kay Castellanos  MRN: [id number]  [place] 1945  Date of evaluation: 5/29/2021  Sukhwinder Veronica MD    CHIEF COMPLAINT           HPI  Kay Castellanos is a 67 y.o. male per chart review has a h/o bph, CAD s/p PCI presents to the ED with chest pain. Pt notes gradual onset, moderate, constant, burning, bilateral chest pain since yesterday morning around 7 am.  Pain travels down L arm. Pt denies fever, n/v, dizziness, diaphoresis, ab pain, dysuria, diarrhea. ROS  Review of Systems   Constitutional: Negative for activity change, chills and fever. HENT: Negative for ear pain and sore throat. Eyes: Negative for visual disturbance. Respiratory: Negative for cough and shortness of breath. Cardiovascular: Positive for chest pain. Negative for palpitations and leg swelling. Gastrointestinal: Negative for abdominal pain, diarrhea, nausea and vomiting. Genitourinary: Negative for dysuria. Musculoskeletal: Negative for back pain. Skin: Negative for rash. Neurological: Negative for dizziness and weakness. Except as noted above the remainder of the review of systems was reviewed and negative. PAST MEDICAL HISTORY     Past Medical History:   Diagnosis Date    BPH (benign prostatic hyperplasia)     ED (erectile dysfunction)     Hematuria     History of heart attack          SURGICAL HISTORY       Past Surgical History:   Procedure Laterality Date    CARDIAC CATHETERIZATION  2011    6 STENTS    FOOT SURGERY      HERNIA REPAIR      NOSE SURGERY      TONSILLECTOMY           CURRENTMEDICATIONS       Previous Medications    ATORVASTATIN (LIPITOR) 40 MG TABLET        METFORMIN (GLUCOPHAGE) 500 MG TABLET        TADALAFIL (CIALIS) 20 MG TABLET    Take 1 tablet by mouth daily.     TAMSULOSIN (FLOMAX) 0.4 MG CAPSULE        TOVIAZ 8 MG TB24           ALLERGIES     Pcn [penicillins]    FAMILY HISTORY     History reviewed. No pertinent family history. SOCIAL HISTORY       Social History     Socioeconomic History    Marital status:      Spouse name: None    Number of children: None    Years of education: None    Highest education level: None   Occupational History    None   Tobacco Use    Smoking status: Never Smoker    Smokeless tobacco: Never Used   Substance and Sexual Activity    Alcohol use: No    Drug use: No    Sexual activity: None   Other Topics Concern    None   Social History Narrative    None     Social Determinants of Health     Financial Resource Strain:     Difficulty of Paying Living Expenses:    Food Insecurity:     Worried About Running Out of Food in the Last Year:     Ran Out of Food in the Last Year:    Transportation Needs:     Lack of Transportation (Medical):  Lack of Transportation (Non-Medical):    Physical Activity:     Days of Exercise per Week:     Minutes of Exercise per Session:    Stress:     Feeling of Stress :    Social Connections:     Frequency of Communication with Friends and Family:     Frequency of Social Gatherings with Friends and Family:     Attends Synagogue Services:     Active Member of Clubs or Organizations:     Attends Club or Organization Meetings:     Marital Status:    Intimate Partner Violence:     Fear of Current or Ex-Partner:     Emotionally Abused:     Physically Abused:     Sexually Abused:          PHYSICAL EXAM       ED Triage Vitals [05/29/21 0923]   BP Temp Temp Source Pulse Resp SpO2 Height Weight   (!) 154/74 97.7 °F (36.5 °C) Oral 64 16 97 % 5' 8\" (1.727 m) 178 lb (80.7 kg)       Physical Exam  Vitals and nursing note reviewed. Constitutional:       Appearance: He is well-developed. HENT:      Head: Normocephalic. Right Ear: External ear normal.      Left Ear: External ear normal.   Eyes:      Conjunctiva/sclera: Conjunctivae normal.      Pupils: Pupils are equal, round, and reactive to light. Cardiovascular:      Rate and Rhythm: Normal rate and regular rhythm. Heart sounds: Normal heart sounds. Pulmonary:      Effort: Pulmonary effort is normal.      Breath sounds: Normal breath sounds. Abdominal:      General: Bowel sounds are normal. There is no distension. Palpations: Abdomen is soft. Tenderness: There is no abdominal tenderness. Musculoskeletal:         General: Normal range of motion. Cervical back: Normal range of motion and neck supple. Skin:     General: Skin is warm and dry. Neurological:      Mental Status: He is alert and oriented to person, place, and time. Psychiatric:         Mood and Affect: Mood normal.           MDM  68 yo male presents to the ED with chest pain. Pt is afebrile, hemodynamically stable. Pt given 1 L NS, IV morphine, IV zofran, IV toradol with moderate relief. EKG shows NSR with HR 60, normal axis, normal intervals, no ST changes. Labs remarkable for troponin 0.097. CT PE negative. Pt states he had a heart attack in 1997 and had 7 stents in his heart. Pt reassessed and has active 5/10 chest pain. Pt started on nitro gtt and drip increased to 50 mcg/min. Pt reassessed and still has chest pain. Pt's bp 100s. Pt still with 4/10 chest pain. Pt given PO asa and subQ lovenox for NSTEMI. Case discussed with Dr. Anibal Montoya (cardiology) who recommended admission to cath lab. Given NSTEMI with active chest pain, case discussed with Dr. Tayler German (interventional cardiology) who recommended admission to cath lab. Case then discussed with Dr. Nydia Rodrigues (medicine) and pt admitted to hospital in serious condition. Pt understands plan.      Critical care time:  62 min excluding procedures      FINAL IMPRESSION      1. NSTEMI (non-ST elevated myocardial infarction) [facility])          DISPOSITION/PLAN   DISPOSITION          DISCHARGE MEDICATIONS:  [unfilled]         Yoan Rodriguez MD(electronically signed)  Attending Emergency Physician            Mallory Mccann Raisa Cui MD  05/29/21 8568

## 2021-05-29 NOTE — PROGRESS NOTES
Patient seen and examined in the emergency room. Presented with chest pain radiating to the arms including left arm. History of CAD with multiple stents. Troponin elevation on arrival to the emergency room. Pain did decrease with IV nitroglycerin drip. However pain did not completely resolve, cardiology contacted. Plan for cardiac catheterization; to be transported from emergency room to cardiac catheterization lab, interventionalist on  her way in now.     Detailed history and physical to follow

## 2021-05-31 LAB
GFR AFRICAN AMERICAN: >60
GFR NON-AFRICAN AMERICAN: >60
PERFORMED ON: NORMAL
POC CREATININE: 1.1 MG/DL (ref 0.8–1.3)
POC SAMPLE TYPE: NORMAL

## 2021-06-02 LAB
EKG ATRIAL RATE: 111 BPM
EKG ATRIAL RATE: 115 BPM
EKG ATRIAL RATE: 60 BPM
EKG P AXIS: -20 DEGREES
EKG P-R INTERVAL: 200 MS
EKG Q-T INTERVAL: 328 MS
EKG Q-T INTERVAL: 334 MS
EKG Q-T INTERVAL: 410 MS
EKG QRS DURATION: 106 MS
EKG QRS DURATION: 106 MS
EKG QRS DURATION: 92 MS
EKG QTC CALCULATION (BAZETT): 410 MS
EKG QTC CALCULATION (BAZETT): 430 MS
EKG QTC CALCULATION (BAZETT): 443 MS
EKG R AXIS: 13 DEGREES
EKG R AXIS: 39 DEGREES
EKG R AXIS: 59 DEGREES
EKG T AXIS: 102 DEGREES
EKG T AXIS: 79 DEGREES
EKG T AXIS: 85 DEGREES
EKG VENTRICULAR RATE: 100 BPM
EKG VENTRICULAR RATE: 110 BPM
EKG VENTRICULAR RATE: 60 BPM

## 2021-06-22 LAB
ANION GAP SERPL CALCULATED.3IONS-SCNC: 13 MEQ/L (ref 9–15)
BUN BLDV-MCNC: 11 MG/DL (ref 8–23)
CHLORIDE BLD-SCNC: 100 MEQ/L (ref 95–107)
CHOLESTEROL, TOTAL: 117 MG/DL (ref 0–199)
CO2: 26 MEQ/L (ref 20–31)
CREAT SERPL-MCNC: 0.96 MG/DL (ref 0.7–1.2)
GFR AFRICAN AMERICAN: >60
GFR NON-AFRICAN AMERICAN: >60
HBA1C MFR BLD: 7.5 % (ref 4.8–5.9)
HCT VFR BLD CALC: 36.3 % (ref 42–52)
HDLC SERPL-MCNC: 38 MG/DL (ref 40–59)
HEMOGLOBIN: 12 G/DL (ref 14–18)
LDL CHOLESTEROL CALCULATED: 32 MG/DL (ref 0–129)
MAGNESIUM: 1.9 MG/DL (ref 1.7–2.4)
MCH RBC QN AUTO: 31.6 PG (ref 27–31.3)
MCHC RBC AUTO-ENTMCNC: 33.1 % (ref 33–37)
MCV RBC AUTO: 95.3 FL (ref 80–100)
PDW BLD-RTO: 15.2 % (ref 11.5–14.5)
PLATELET # BLD: 384 K/UL (ref 130–400)
POTASSIUM SERPL-SCNC: 4.3 MEQ/L (ref 3.4–4.9)
RBC # BLD: 3.81 M/UL (ref 4.7–6.1)
SODIUM BLD-SCNC: 139 MEQ/L (ref 135–144)
TOTAL CK: 66 U/L (ref 0–190)
TRIGL SERPL-MCNC: 233 MG/DL (ref 0–150)
WBC # BLD: 5.1 K/UL (ref 4.8–10.8)

## 2021-07-20 ENCOUNTER — HOSPITAL ENCOUNTER (OUTPATIENT)
Dept: CARDIAC REHAB | Age: 76
Setting detail: THERAPIES SERIES
Discharge: HOME | End: 2021-07-20
Payer: MEDICARE

## 2021-07-20 PROCEDURE — 93798 PHYS/QHP OP CAR RHAB W/ECG: CPT

## 2021-07-21 ENCOUNTER — HOSPITAL ENCOUNTER (OUTPATIENT)
Dept: CARDIAC REHAB | Age: 76
Setting detail: THERAPIES SERIES
Discharge: HOME | End: 2021-07-21
Payer: MEDICARE

## 2021-07-21 PROCEDURE — 93798 PHYS/QHP OP CAR RHAB W/ECG: CPT

## 2021-07-23 ENCOUNTER — HOSPITAL ENCOUNTER (OUTPATIENT)
Dept: CARDIAC REHAB | Age: 76
Setting detail: THERAPIES SERIES
Discharge: HOME | End: 2021-07-23
Payer: MEDICARE

## 2021-07-23 PROCEDURE — 93798 PHYS/QHP OP CAR RHAB W/ECG: CPT

## 2021-07-26 ENCOUNTER — HOSPITAL ENCOUNTER (OUTPATIENT)
Dept: CARDIAC REHAB | Age: 76
Setting detail: THERAPIES SERIES
Discharge: HOME | End: 2021-07-26
Payer: MEDICARE

## 2021-07-26 PROCEDURE — 93798 PHYS/QHP OP CAR RHAB W/ECG: CPT

## 2021-07-28 ENCOUNTER — HOSPITAL ENCOUNTER (OUTPATIENT)
Dept: CARDIAC REHAB | Age: 76
Setting detail: THERAPIES SERIES
Discharge: HOME | End: 2021-07-28
Payer: MEDICARE

## 2021-07-28 PROCEDURE — 93798 PHYS/QHP OP CAR RHAB W/ECG: CPT

## 2021-07-30 ENCOUNTER — HOSPITAL ENCOUNTER (OUTPATIENT)
Dept: CARDIAC REHAB | Age: 76
Setting detail: THERAPIES SERIES
Discharge: HOME | End: 2021-07-30
Payer: MEDICARE

## 2021-07-30 PROCEDURE — 93798 PHYS/QHP OP CAR RHAB W/ECG: CPT

## 2021-08-02 ENCOUNTER — HOSPITAL ENCOUNTER (OUTPATIENT)
Age: 76
Setting detail: THERAPIES SERIES
Discharge: HOME | End: 2021-08-02
Payer: MEDICARE

## 2021-08-02 ENCOUNTER — HOSPITAL ENCOUNTER (OUTPATIENT)
Dept: CARDIAC REHAB | Age: 76
Setting detail: THERAPIES SERIES
Discharge: HOME | End: 2021-08-02
Payer: MEDICARE

## 2021-08-02 PROCEDURE — 93798 PHYS/QHP OP CAR RHAB W/ECG: CPT

## 2021-08-04 ENCOUNTER — HOSPITAL ENCOUNTER (OUTPATIENT)
Dept: CARDIAC REHAB | Age: 76
Setting detail: THERAPIES SERIES
Discharge: HOME | End: 2021-08-04
Payer: MEDICARE

## 2021-08-04 PROCEDURE — 93798 PHYS/QHP OP CAR RHAB W/ECG: CPT

## 2021-08-06 ENCOUNTER — HOSPITAL ENCOUNTER (OUTPATIENT)
Dept: CARDIAC REHAB | Age: 76
Setting detail: THERAPIES SERIES
Discharge: HOME | End: 2021-08-06
Payer: MEDICARE

## 2021-08-06 PROCEDURE — 93798 PHYS/QHP OP CAR RHAB W/ECG: CPT

## 2021-08-09 ENCOUNTER — HOSPITAL ENCOUNTER (OUTPATIENT)
Dept: CARDIAC REHAB | Age: 76
Setting detail: THERAPIES SERIES
Discharge: HOME | End: 2021-08-09
Payer: MEDICARE

## 2021-08-09 PROCEDURE — 93798 PHYS/QHP OP CAR RHAB W/ECG: CPT

## 2021-08-11 ENCOUNTER — APPOINTMENT (OUTPATIENT)
Dept: CARDIAC REHAB | Age: 76
End: 2021-08-11
Payer: MEDICARE

## 2021-08-11 ENCOUNTER — HOSPITAL ENCOUNTER (OUTPATIENT)
Dept: CARDIAC REHAB | Age: 76
Setting detail: THERAPIES SERIES
Discharge: HOME | End: 2021-08-11
Payer: MEDICARE

## 2021-08-11 PROCEDURE — 93798 PHYS/QHP OP CAR RHAB W/ECG: CPT

## 2021-08-13 ENCOUNTER — APPOINTMENT (OUTPATIENT)
Dept: CARDIAC REHAB | Age: 76
End: 2021-08-13
Payer: MEDICARE

## 2021-08-13 ENCOUNTER — HOSPITAL ENCOUNTER (OUTPATIENT)
Dept: CARDIAC REHAB | Age: 76
Setting detail: THERAPIES SERIES
Discharge: HOME | End: 2021-08-13
Payer: MEDICARE

## 2021-08-13 LAB
ANION GAP SERPL CALCULATED.3IONS-SCNC: 13 MEQ/L (ref 9–15)
BUN BLDV-MCNC: 19 MG/DL (ref 8–23)
CHLORIDE BLD-SCNC: 98 MEQ/L (ref 95–107)
CHOLESTEROL, TOTAL: 102 MG/DL (ref 0–199)
CO2: 27 MEQ/L (ref 20–31)
CREAT SERPL-MCNC: 1.04 MG/DL (ref 0.7–1.2)
GFR AFRICAN AMERICAN: >60
GFR NON-AFRICAN AMERICAN: >60
HDLC SERPL-MCNC: 38 MG/DL (ref 40–59)
LDL CHOLESTEROL CALCULATED: 43 MG/DL (ref 0–129)
MAGNESIUM: 2 MG/DL (ref 1.7–2.4)
POTASSIUM SERPL-SCNC: 3.9 MEQ/L (ref 3.4–4.9)
SODIUM BLD-SCNC: 138 MEQ/L (ref 135–144)
TRIGL SERPL-MCNC: 105 MG/DL (ref 0–150)

## 2021-08-13 PROCEDURE — 93798 PHYS/QHP OP CAR RHAB W/ECG: CPT

## 2021-08-16 ENCOUNTER — APPOINTMENT (OUTPATIENT)
Dept: CARDIAC REHAB | Age: 76
End: 2021-08-16
Payer: MEDICARE

## 2021-08-18 ENCOUNTER — APPOINTMENT (OUTPATIENT)
Dept: CARDIAC REHAB | Age: 76
End: 2021-08-18
Payer: MEDICARE

## 2021-08-18 ENCOUNTER — HOSPITAL ENCOUNTER (OUTPATIENT)
Dept: CARDIAC REHAB | Age: 76
Setting detail: THERAPIES SERIES
Discharge: HOME | End: 2021-08-18
Payer: MEDICARE

## 2021-08-18 PROCEDURE — 93798 PHYS/QHP OP CAR RHAB W/ECG: CPT

## 2021-08-20 ENCOUNTER — APPOINTMENT (OUTPATIENT)
Dept: CARDIAC REHAB | Age: 76
End: 2021-08-20
Payer: MEDICARE

## 2021-08-20 ENCOUNTER — HOSPITAL ENCOUNTER (OUTPATIENT)
Dept: CARDIAC REHAB | Age: 76
Setting detail: THERAPIES SERIES
Discharge: HOME | End: 2021-08-20
Payer: MEDICARE

## 2021-08-20 PROCEDURE — 93798 PHYS/QHP OP CAR RHAB W/ECG: CPT

## 2021-08-23 ENCOUNTER — HOSPITAL ENCOUNTER (OUTPATIENT)
Dept: CARDIAC REHAB | Age: 76
Setting detail: THERAPIES SERIES
Discharge: HOME | End: 2021-08-23
Payer: MEDICARE

## 2021-08-23 ENCOUNTER — APPOINTMENT (OUTPATIENT)
Dept: CARDIAC REHAB | Age: 76
End: 2021-08-23
Payer: MEDICARE

## 2021-08-23 PROCEDURE — 93798 PHYS/QHP OP CAR RHAB W/ECG: CPT

## 2021-08-25 ENCOUNTER — HOSPITAL ENCOUNTER (OUTPATIENT)
Dept: CARDIAC REHAB | Age: 76
Setting detail: THERAPIES SERIES
Discharge: HOME | End: 2021-08-25
Payer: MEDICARE

## 2021-08-25 ENCOUNTER — APPOINTMENT (OUTPATIENT)
Dept: CARDIAC REHAB | Age: 76
End: 2021-08-25
Payer: MEDICARE

## 2021-08-25 PROCEDURE — 93798 PHYS/QHP OP CAR RHAB W/ECG: CPT

## 2021-08-27 ENCOUNTER — HOSPITAL ENCOUNTER (OUTPATIENT)
Dept: CARDIAC REHAB | Age: 76
Setting detail: THERAPIES SERIES
Discharge: HOME | End: 2021-08-27
Payer: MEDICARE

## 2021-08-27 ENCOUNTER — APPOINTMENT (OUTPATIENT)
Dept: CARDIAC REHAB | Age: 76
End: 2021-08-27
Payer: MEDICARE

## 2021-08-27 PROCEDURE — 93798 PHYS/QHP OP CAR RHAB W/ECG: CPT

## 2021-08-30 ENCOUNTER — APPOINTMENT (OUTPATIENT)
Dept: CARDIAC REHAB | Age: 76
End: 2021-08-30
Payer: MEDICARE

## 2021-08-30 ENCOUNTER — HOSPITAL ENCOUNTER (OUTPATIENT)
Dept: CARDIAC REHAB | Age: 76
Setting detail: THERAPIES SERIES
Discharge: HOME | End: 2021-08-30
Payer: MEDICARE

## 2021-08-30 PROCEDURE — 93798 PHYS/QHP OP CAR RHAB W/ECG: CPT

## 2021-09-01 ENCOUNTER — HOSPITAL ENCOUNTER (OUTPATIENT)
Dept: CARDIAC REHAB | Age: 76
Setting detail: THERAPIES SERIES
Discharge: HOME | End: 2021-09-01
Payer: MEDICARE

## 2021-09-01 ENCOUNTER — APPOINTMENT (OUTPATIENT)
Dept: CARDIAC REHAB | Age: 76
End: 2021-09-01
Payer: MEDICARE

## 2021-09-01 PROCEDURE — 93798 PHYS/QHP OP CAR RHAB W/ECG: CPT

## 2021-09-03 ENCOUNTER — APPOINTMENT (OUTPATIENT)
Dept: CARDIAC REHAB | Age: 76
End: 2021-09-03
Payer: MEDICARE

## 2021-09-03 ENCOUNTER — HOSPITAL ENCOUNTER (OUTPATIENT)
Dept: CARDIAC REHAB | Age: 76
Setting detail: THERAPIES SERIES
Discharge: HOME | End: 2021-09-03
Payer: MEDICARE

## 2021-09-03 PROCEDURE — 93798 PHYS/QHP OP CAR RHAB W/ECG: CPT

## 2021-09-08 ENCOUNTER — HOSPITAL ENCOUNTER (OUTPATIENT)
Dept: CARDIAC REHAB | Age: 76
Setting detail: THERAPIES SERIES
Discharge: HOME | End: 2021-09-08
Payer: MEDICARE

## 2021-09-08 ENCOUNTER — APPOINTMENT (OUTPATIENT)
Dept: CARDIAC REHAB | Age: 76
End: 2021-09-08
Payer: MEDICARE

## 2021-09-08 PROCEDURE — 93798 PHYS/QHP OP CAR RHAB W/ECG: CPT

## 2021-09-10 ENCOUNTER — APPOINTMENT (OUTPATIENT)
Dept: CARDIAC REHAB | Age: 76
End: 2021-09-10
Payer: MEDICARE

## 2021-09-10 ENCOUNTER — HOSPITAL ENCOUNTER (OUTPATIENT)
Dept: CARDIAC REHAB | Age: 76
Setting detail: THERAPIES SERIES
Discharge: HOME | End: 2021-09-10
Payer: MEDICARE

## 2021-09-10 PROCEDURE — 93798 PHYS/QHP OP CAR RHAB W/ECG: CPT

## 2021-09-13 ENCOUNTER — HOSPITAL ENCOUNTER (OUTPATIENT)
Dept: CARDIAC REHAB | Age: 76
Setting detail: THERAPIES SERIES
Discharge: HOME | End: 2021-09-13
Payer: MEDICARE

## 2021-09-13 ENCOUNTER — APPOINTMENT (OUTPATIENT)
Dept: CARDIAC REHAB | Age: 76
End: 2021-09-13
Payer: MEDICARE

## 2021-09-13 PROCEDURE — 93798 PHYS/QHP OP CAR RHAB W/ECG: CPT

## 2021-09-15 ENCOUNTER — HOSPITAL ENCOUNTER (OUTPATIENT)
Dept: CARDIAC REHAB | Age: 76
Setting detail: THERAPIES SERIES
Discharge: HOME | End: 2021-09-15
Payer: MEDICARE

## 2021-09-15 ENCOUNTER — APPOINTMENT (OUTPATIENT)
Dept: CARDIAC REHAB | Age: 76
End: 2021-09-15
Payer: MEDICARE

## 2021-09-15 PROCEDURE — 93798 PHYS/QHP OP CAR RHAB W/ECG: CPT

## 2021-09-17 ENCOUNTER — APPOINTMENT (OUTPATIENT)
Dept: CARDIAC REHAB | Age: 76
End: 2021-09-17
Payer: MEDICARE

## 2021-09-20 ENCOUNTER — APPOINTMENT (OUTPATIENT)
Dept: CARDIAC REHAB | Age: 76
End: 2021-09-20
Payer: MEDICARE

## 2021-09-20 ENCOUNTER — HOSPITAL ENCOUNTER (OUTPATIENT)
Dept: CARDIAC REHAB | Age: 76
Setting detail: THERAPIES SERIES
Discharge: HOME | End: 2021-09-20
Payer: MEDICARE

## 2021-09-20 PROCEDURE — 93798 PHYS/QHP OP CAR RHAB W/ECG: CPT

## 2021-09-22 ENCOUNTER — APPOINTMENT (OUTPATIENT)
Dept: CARDIAC REHAB | Age: 76
End: 2021-09-22
Payer: MEDICARE

## 2021-09-22 ENCOUNTER — HOSPITAL ENCOUNTER (OUTPATIENT)
Dept: CARDIAC REHAB | Age: 76
Setting detail: THERAPIES SERIES
Discharge: HOME | End: 2021-09-22
Payer: MEDICARE

## 2021-09-22 PROCEDURE — 93798 PHYS/QHP OP CAR RHAB W/ECG: CPT

## 2021-09-24 ENCOUNTER — HOSPITAL ENCOUNTER (OUTPATIENT)
Dept: CARDIAC REHAB | Age: 76
Setting detail: THERAPIES SERIES
Discharge: HOME | End: 2021-09-24
Payer: MEDICARE

## 2021-09-24 ENCOUNTER — APPOINTMENT (OUTPATIENT)
Dept: CARDIAC REHAB | Age: 76
End: 2021-09-24
Payer: MEDICARE

## 2021-09-24 PROCEDURE — 93798 PHYS/QHP OP CAR RHAB W/ECG: CPT

## 2021-09-27 ENCOUNTER — HOSPITAL ENCOUNTER (OUTPATIENT)
Dept: CARDIAC REHAB | Age: 76
Setting detail: THERAPIES SERIES
Discharge: HOME | End: 2021-09-27
Payer: MEDICARE

## 2021-09-27 ENCOUNTER — APPOINTMENT (OUTPATIENT)
Dept: CARDIAC REHAB | Age: 76
End: 2021-09-27
Payer: MEDICARE

## 2021-09-27 PROCEDURE — 93798 PHYS/QHP OP CAR RHAB W/ECG: CPT

## 2021-09-29 ENCOUNTER — HOSPITAL ENCOUNTER (OUTPATIENT)
Dept: CARDIAC REHAB | Age: 76
Setting detail: THERAPIES SERIES
Discharge: HOME | End: 2021-09-29
Payer: MEDICARE

## 2021-09-29 ENCOUNTER — APPOINTMENT (OUTPATIENT)
Dept: CARDIAC REHAB | Age: 76
End: 2021-09-29
Payer: MEDICARE

## 2021-09-29 PROCEDURE — 93798 PHYS/QHP OP CAR RHAB W/ECG: CPT

## 2021-10-01 ENCOUNTER — APPOINTMENT (OUTPATIENT)
Dept: CARDIAC REHAB | Age: 76
End: 2021-10-01
Payer: MEDICARE

## 2021-10-01 ENCOUNTER — HOSPITAL ENCOUNTER (OUTPATIENT)
Dept: CARDIAC REHAB | Age: 76
Setting detail: THERAPIES SERIES
Discharge: HOME | End: 2021-10-01
Payer: MEDICARE

## 2021-10-01 PROCEDURE — 93798 PHYS/QHP OP CAR RHAB W/ECG: CPT

## 2021-10-04 ENCOUNTER — HOSPITAL ENCOUNTER (OUTPATIENT)
Dept: CARDIAC REHAB | Age: 76
Setting detail: THERAPIES SERIES
Discharge: HOME | End: 2021-10-04
Payer: MEDICARE

## 2021-10-04 ENCOUNTER — APPOINTMENT (OUTPATIENT)
Dept: CARDIAC REHAB | Age: 76
End: 2021-10-04
Payer: MEDICARE

## 2021-10-04 PROCEDURE — 93798 PHYS/QHP OP CAR RHAB W/ECG: CPT

## 2021-10-06 ENCOUNTER — APPOINTMENT (OUTPATIENT)
Dept: CARDIAC REHAB | Age: 76
End: 2021-10-06
Payer: MEDICARE

## 2021-10-06 ENCOUNTER — HOSPITAL ENCOUNTER (OUTPATIENT)
Dept: CARDIAC REHAB | Age: 76
Setting detail: THERAPIES SERIES
Discharge: HOME | End: 2021-10-06
Payer: MEDICARE

## 2021-10-06 PROCEDURE — 93798 PHYS/QHP OP CAR RHAB W/ECG: CPT

## 2021-10-08 ENCOUNTER — APPOINTMENT (OUTPATIENT)
Dept: CARDIAC REHAB | Age: 76
End: 2021-10-08
Payer: MEDICARE

## 2021-10-08 ENCOUNTER — HOSPITAL ENCOUNTER (OUTPATIENT)
Dept: CARDIAC REHAB | Age: 76
Setting detail: THERAPIES SERIES
Discharge: HOME | End: 2021-10-08
Payer: MEDICARE

## 2021-10-08 PROCEDURE — 93798 PHYS/QHP OP CAR RHAB W/ECG: CPT

## 2021-10-11 ENCOUNTER — APPOINTMENT (OUTPATIENT)
Dept: CARDIAC REHAB | Age: 76
End: 2021-10-11
Payer: MEDICARE

## 2021-10-11 ENCOUNTER — HOSPITAL ENCOUNTER (OUTPATIENT)
Dept: CARDIAC REHAB | Age: 76
Setting detail: THERAPIES SERIES
Discharge: HOME | End: 2021-10-11
Payer: MEDICARE

## 2021-10-11 PROCEDURE — 93798 PHYS/QHP OP CAR RHAB W/ECG: CPT

## 2021-10-13 ENCOUNTER — HOSPITAL ENCOUNTER (OUTPATIENT)
Dept: CARDIAC REHAB | Age: 76
Setting detail: THERAPIES SERIES
Discharge: HOME | End: 2021-10-13
Payer: MEDICARE

## 2021-10-13 ENCOUNTER — APPOINTMENT (OUTPATIENT)
Dept: CARDIAC REHAB | Age: 76
End: 2021-10-13
Payer: MEDICARE

## 2021-10-13 PROCEDURE — 93798 PHYS/QHP OP CAR RHAB W/ECG: CPT

## 2021-10-15 ENCOUNTER — HOSPITAL ENCOUNTER (OUTPATIENT)
Dept: CARDIAC REHAB | Age: 76
Setting detail: THERAPIES SERIES
Discharge: HOME | End: 2021-10-15
Payer: MEDICARE

## 2021-10-15 ENCOUNTER — APPOINTMENT (OUTPATIENT)
Dept: CARDIAC REHAB | Age: 76
End: 2021-10-15
Payer: MEDICARE

## 2021-10-15 PROCEDURE — 93798 PHYS/QHP OP CAR RHAB W/ECG: CPT

## 2021-10-18 ENCOUNTER — APPOINTMENT (OUTPATIENT)
Dept: CARDIAC REHAB | Age: 76
End: 2021-10-18
Payer: MEDICARE

## 2021-10-20 ENCOUNTER — APPOINTMENT (OUTPATIENT)
Dept: CARDIAC REHAB | Age: 76
End: 2021-10-20
Payer: MEDICARE

## 2021-10-22 ENCOUNTER — APPOINTMENT (OUTPATIENT)
Dept: CARDIAC REHAB | Age: 76
End: 2021-10-22
Payer: MEDICARE

## 2022-03-21 LAB
ANION GAP SERPL CALCULATED.3IONS-SCNC: 12 MEQ/L (ref 9–15)
BUN BLDV-MCNC: 15 MG/DL (ref 8–23)
CALCIUM SERPL-MCNC: 9.8 MG/DL (ref 8.5–9.9)
CHLORIDE BLD-SCNC: 101 MEQ/L (ref 95–107)
CHOLESTEROL, TOTAL: 117 MG/DL (ref 0–199)
CO2: 29 MEQ/L (ref 20–31)
CREAT SERPL-MCNC: 1.11 MG/DL (ref 0.7–1.2)
GFR AFRICAN AMERICAN: >60
GFR NON-AFRICAN AMERICAN: >60
GLUCOSE BLD-MCNC: 100 MG/DL (ref 70–99)
HBA1C MFR BLD: 6.5 % (ref 4.8–5.9)
HDLC SERPL-MCNC: 49 MG/DL (ref 40–59)
LDL CHOLESTEROL CALCULATED: 50 MG/DL (ref 0–129)
MAGNESIUM: 2.1 MG/DL (ref 1.7–2.4)
POTASSIUM SERPL-SCNC: 4.8 MEQ/L (ref 3.4–4.9)
SODIUM BLD-SCNC: 142 MEQ/L (ref 135–144)
TOTAL CK: 102 U/L (ref 0–190)
TRIGL SERPL-MCNC: 91 MG/DL (ref 0–150)

## 2022-04-25 ENCOUNTER — HOSPITAL ENCOUNTER (OUTPATIENT)
Dept: RADIATION ONCOLOGY | Age: 77
Discharge: HOME | End: 2022-04-25
Payer: MEDICARE

## 2022-04-25 VITALS
BODY MASS INDEX: 24.31 KG/M2 | TEMPERATURE: 97.5 F | SYSTOLIC BLOOD PRESSURE: 137 MMHG | WEIGHT: 160.4 LBS | RESPIRATION RATE: 16 BRPM | OXYGEN SATURATION: 99 % | HEIGHT: 68 IN | DIASTOLIC BLOOD PRESSURE: 66 MMHG

## 2022-04-25 DIAGNOSIS — C61 PROSTATE CANCER (HCC): Primary | ICD-10-CM

## 2022-04-25 PROCEDURE — 99205 OFFICE O/P NEW HI 60 MIN: CPT | Performed by: RADIOLOGY

## 2022-04-25 PROCEDURE — 99212 OFFICE O/P EST SF 10 MIN: CPT | Performed by: RADIOLOGY

## 2022-04-25 RX ORDER — EMPAGLIFLOZIN 10 MG/1
10 TABLET, FILM COATED ORAL DAILY
COMMUNITY

## 2022-04-25 RX ORDER — LEVOFLOXACIN 500 MG/1
500 TABLET, FILM COATED ORAL ONCE
Qty: 1 TABLET | Refills: 0 | Status: SHIPPED | OUTPATIENT
Start: 2022-04-25 | End: 2022-04-25

## 2022-04-25 RX ORDER — LOSARTAN POTASSIUM 25 MG/1
25 TABLET ORAL DAILY
COMMUNITY

## 2022-04-25 RX ORDER — NITROGLYCERIN 0.4 MG/1
0.4 TABLET SUBLINGUAL EVERY 5 MIN PRN
COMMUNITY

## 2022-04-25 RX ORDER — OXYCODONE AND ACETAMINOPHEN 10; 325 MG/1; MG/1
1 TABLET ORAL ONCE
Qty: 1 TABLET | Refills: 0 | Status: SHIPPED | OUTPATIENT
Start: 2022-04-25 | End: 2022-04-25

## 2022-04-25 RX ORDER — METOPROLOL SUCCINATE 50 MG/1
50 TABLET, EXTENDED RELEASE ORAL DAILY
COMMUNITY

## 2022-04-25 RX ORDER — ATORVASTATIN CALCIUM 80 MG/1
80 TABLET, FILM COATED ORAL DAILY
COMMUNITY

## 2022-04-25 RX ORDER — HYDROCHLOROTHIAZIDE 25 MG/1
25 TABLET ORAL DAILY
COMMUNITY

## 2022-04-25 RX ORDER — FINASTERIDE 5 MG/1
5 TABLET, FILM COATED ORAL DAILY
COMMUNITY

## 2022-04-25 RX ORDER — ACETAMINOPHEN 160 MG
2000 TABLET,DISINTEGRATING ORAL DAILY
COMMUNITY

## 2022-04-25 RX ORDER — ALFUZOSIN HYDROCHLORIDE 10 MG/1
10 TABLET, EXTENDED RELEASE ORAL DAILY
COMMUNITY

## 2022-04-25 RX ORDER — ICOSAPENT ETHYL 1000 MG/1
2 CAPSULE ORAL 2 TIMES DAILY
COMMUNITY

## 2022-04-25 RX ORDER — ORAL SEMAGLUTIDE 7 MG/1
7 TABLET ORAL DAILY
COMMUNITY

## 2022-04-25 RX ORDER — LORAZEPAM 1 MG/1
1 TABLET ORAL ONCE
Qty: 1 TABLET | Refills: 0 | Status: SHIPPED | OUTPATIENT
Start: 2022-04-25 | End: 2022-04-25

## 2022-04-25 NOTE — PROGRESS NOTES
SW visit with Pt and spouse, Mathew Boston prior to Pt Radiation Oncology consult with Dr. Elsa Owen. Pt states a distress score of \"zero\"; states does not typically \"worry about things I have no control of\". Spouse states she is the \"worrier\" in the family. Pt had a triple heart bypass surgery 8 mos ago. States he feels well. Pt is a retired  from 30 plus years with [employer]. 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 Pt has 6 children (all but 2 reside locally), 9 grandchildren and 10 great grandchildren. SW provided business card contact as no current SW needs at this time.

## 2022-04-25 NOTE — CONSULTS
NURSING ASSESSMENT     Date: 4/25/2022        Patient Name: Maynor Mills     YOB: 1945      Age:  65 y.o. MRN: [id number]       Chaperone [x] Yes   [] No  Wife Kam Headley is present    Advance Directives:   Do you currently have completed advance directives (living will)? [x] Yes   [] No         *If yes, please bring us a copy for your records. *If no, would you like info or assistance in completing advance directives (living will)? [] Yes   [x] No    Pain Score:   Pain Score (1-10): 5  Pain Location: Lower abdomen pressure on occasion. Pain Duration: approximately for 30 minutes depending on how long he sits or activity. Pain Management/Control: Tylenol as needed      Is pain affecting your ability to take care of yourself or move throughout your home? [] Yes   [x] No    General: Weakness at times  Patient has gained weight [] Yes   [x] No  Patient has lost weight [x] Yes   [] No  How much weight in pounds and over what length of time: Down 18 lbs since 6/2021 after starting metformin    Eyes (Ophthalmic): Wears eye glasses     Skin (Dermatological): Right shin abrasion     ENT: No Problems     Respiratory: Cough with occasional pale yellow phlegm,     Cardiovascular: Chest Pain at times, takes sublingual nitro prn. Stress test 5/10/22. H/O 3 vessel CABG 6/2021      Device   [] Yes   [x] No   Copy of Card Obtained [] Yes   [x] No    Gastrointestinal: Diarrhea depending on food choices. Genito-Urinary:       Frequency- drinks 6 bottles of water,16 oz each   Nocturia times 1    Impotence- denies       Breast: No Problems     Musculoskeletal: No Problems    Neurological: Dizziness when bends down and gets back up. Hematological and Lymphatic: Easy Bruising     Endocrine: Diabetes Mellitus        A 10-point review of systems has been conducted and pertinent positives have been   recorded.  All other review of systems are negative    Was the patient admitted during the course of treatment OR within 30 days of treatment?  No      Additional Comments:

## 2022-04-26 NOTE — CONSULTS
[facility]           Radiation Oncology      [address] [facility]        Bel[place] Asa: [phone]        F: [phone]       "[employer]                   Dr. Nimesh Frey MD PhD    CONSULT NOTE     Date of Service: 2022  Patient ID: Audrey Rey   : 1945  MRN: [id number]   Acct Number: [de-identified]       Audrey Rey  65 y.o.   1945    REFERRING PROVIDER: Marquez Coyle    PCP:  aMggy Robertson    DIAGNOSIS: Favorable intermediate risk prostate adenocarcinoma    STAGING: Cancer Staging  Prostate cancer [facility])  Staging form: Prostate, AJCC 8th Edition  - Clinical: Stage IIB (cT1c, cN0, cM0, PSA: 2.3, Grade Group: 2) - Signed by Nimesh Frey MD on 2022      HISTORY OF PRESENT ILLNESS: Mr. Audrey Rey  is a 65y.o. year old male  with history of diabetes mellitus, hypertension, hyperlipidemia, CKD 3, anemia, STEMI, emergent CABG x3 vessels, and more recent diagnosis of [place] 7 (3+4) prostate adenocarcinoma, grade group 2. The history is as follows  2018 4.54  2019 2.11  2021 2.24  2021 2.3    On 2022 he underwent a transrectal ultrasound-guided biopsy of the prostate which revealed Kam 7 (3+4) prostate cancer in 2 out of 12 cores. There was a 30% involvement. On 2022 he had a bone scan that was negative and significant for only degenerative/arthritic changes. On 3/10/2022 he had a CT of the chest and pelvis which was nonsignificant for any findings suggestive of abdominal or pelvic metastases. He was seen by radiation oncologist at the [facility] clinic to discuss his options of active surveillance versus radiation and the patient opted to move forward treatment but wanted to get radiation closer to home. He presents to discuss role and rationale of radiation therapy in the management of disease. Symptomatically he notes a AUA score 5 with frequency and urgency. He has nocturia 1 time nightly.   He denies any erectile dysfunction, hematuria, dysuria, incontinence, diarrhea, or constipation. He does occasionally have lower abdominal pressure and also has had sciatica since a fall in the late 70s while at work. He is a former smoker but quit many years ago. PAST MEDICAL HISTORY:      Diagnosis Date    BPH (benign prostatic hyperplasia)     CAD (coronary artery disease)     Cancer (HCC)     prostate    ED (erectile dysfunction)     Hematuria     History of heart attack     Hypercholesteremia     Hypertension     [facility]I ([facility] elevation [facility] infarction) [facility]     Type 2 diabetes mellitus without complication ([facility]        PAST SURGICAL HISTORY:      Procedure Laterality Date    CARDIAC CATHETERIZATION  2011    6 STENTS    CORONARY ARTERY BYPASS GRAFT  06/02/2021    3 vessel    FOOT SURGERY      HERNIA REPAIR      NOSE SURGERY      TONSILLECTOMY         Allergies   Allergen Reactions    Pcn [Penicillins]        MEDICATIONS:  Medications reviewed and reconciled. Current Outpatient Medications   Medication Sig Dispense Refill    Semaglutide (RYBELSUS) 7 MG TABS Take 7 mg by mouth daily      empagliflozin (JARDIANCE) 10 MG tablet Take 10 mg by mouth daily      Cholecalciferol (VITAMIN D3) 50 MCG (2000 UT) CAPS Take 2,000 Units by mouth daily      nitroGLYCERIN (NITROSTAT) 0.4 MG SL tablet Place 0.4 mg under the tongue every 5 minutes as needed for Chest pain up to max of 3 total doses. If no relief after 1 dose, call 911.       metoprolol succinate (TOPROL XL) 50 MG extended release tablet Take 50 mg by mouth daily      losartan (COZAAR) 25 MG tablet Take 25 mg by mouth daily      Icosapent Ethyl (VASCEPA) 1 g CAPS capsule Take 2 capsules by mouth 2 times daily      hydroCHLOROthiazide (HYDRODIURIL) 25 MG tablet Take 25 mg by mouth daily      finasteride (PROSCAR) 5 MG tablet Take 5 mg by mouth daily      atorvastatin (LIPITOR) 80 MG tablet Take 80 mg by mouth daily      BABY ASPIRIN PO Take 81 mg by mouth daily      alfuzosin (UROXATRAL) 10 MG extended release tablet Take 10 mg by mouth daily      levoFLOXacin (LEVAQUIN) 500 MG tablet Take 1 tablet by mouth once for 1 dose Take 1 tablet by mouth 2 hours before procedure 1 tablet 0    oxyCODONE-acetaminophen (PERCOCET)  MG per tablet Take 1 tablet by mouth once for 1 dose. Take 1 tablet by mouth 1 hour before procedure. 1 tablet 0    LORazepam (ATIVAN) 1 MG tablet Take 1 tablet by mouth once for 1 dose. Take 1 tablet by mouth 1 hour before procedure 1 tablet 0    metFORMIN (GLUCOPHAGE) 500 MG tablet Take 1,000 mg by mouth daily        No current facility-administered medications for this encounter. FAMILY HISTORY:  Family History   Problem Relation Age of Onset    Cancer Mother        SOCIAL HISTORY:       reports that he quit smoking about 25 years ago. He has never used smokeless tobacco..   reports no history of alcohol use. .   reports no history of drug use. REVIEW OF SYSTEMS:  Obtained from the patient, chart review and nursing assessment. Review of Systems     PHYSICAL EXAMINATION:      Vitals:    04/25/22 1018   BP: 137/66   Resp: 16   Temp: 97.5 °F (36.4 °C)   SpO2: 99%   Weight: 160 lb 6.4 oz (72.8 kg)   Height: 5' 8\" (1.727 m)       Wt Readings from Last 3 Encounters:   04/25/22 160 lb 6.4 oz (72.8 kg)   05/29/21 178 lb (80.7 kg)   01/22/14 168 lb (76.2 kg)       ECOG PERFORMANCE STATUS:  1    Physical Exam  Vitals and nursing note reviewed. Constitutional:       Appearance: Normal appearance. Comments: Accompanied by his wife. HENT:      Head: Normocephalic and atraumatic. Eyes:      Extraocular Movements: Extraocular movements intact. Conjunctiva/sclera: Conjunctivae normal.   Cardiovascular:      Rate and Rhythm: Regular rhythm. Heart sounds: Normal heart sounds. Pulmonary:      Breath sounds: Normal breath sounds. Abdominal:      General: Abdomen is flat.       Palpations: Abdomen is soft.   Genitourinary:     Prostate: Normal.      Comments: Digital rectal examination revealed a smooth prostate without nodularity. There was no blood in the examination finger. Musculoskeletal:         General: Normal range of motion. Cervical back: Normal range of motion and neck supple. No rigidity or tenderness. Right lower leg: No edema. Left lower leg: No edema. Lymphadenopathy:      Cervical: No cervical adenopathy. Skin:     General: Skin is warm and dry. Coloration: Skin is not jaundiced. Neurological:      General: No focal deficit present. Mental Status: He is alert and oriented to person, place, and time. Mental status is at baseline. Psychiatric:         Mood and Affect: Mood normal.         Behavior: Behavior normal.         RADIATION SAFETY AND ONCOLOGIC TREATMENT SUPPORT:    - Previous radiation history: None  - History of autoimmune or connective tissue disease: None  - Nutritional support/ PEG: Not applicable  - Dental evaluation: Not applicable  -  requested:   met with the patient, please refer to the note from that encounter.  - Transportation for daily treatment: No issues    TUMOR MARKERS:   Lab Results   Component Value Date    PSA 1.79 03/19/2012       IMAGING REVIEWED: Per HPI    PATHOLOGY REVIEWED: Per HPI    IMPRESSION:  This is a 65year old male with history of diabetes mellitus, hypertension, hyperlipidemia, CKD 3, anemia, STEMI, emergent CABG x3 vessels, and more recent diagnosis of Kam 7 (3+4) prostate adenocarcinoma, grade group 2. He presents to discuss role and rationale of definitive radiation therapy in the management of his disease. DISCUSSION/PLAN:   I reviewed the risks, benefits, and rationale of radiation therapy in the setting of low volume, favorable intermediate risk prostate adenocarcinoma. At the outset, the patient qualifies for active surveillance, surgery, and definitive radiation therapy.  At this time, and he is high risk for any surgical options given his age and comorbidities. He wishes to move away from active surveillance and like to get definitive radiation therapy which I think is reasonable. Given his favorable intermediate risk classification and low pretreatment PSA, he will not need androgen deprivation therapy in the management of his prostate cancer.      I reviewed potential side effects of radiation therapy which include but are not limited to fatigue, dermatitis of the skin of the pelvis at the beam entry exit points, potential for worsening sexual dysfunction, diarrhea, and worsening urinary symptoms such as increased frequency, urgency, nocturia, and diminished stream.     I will coordinate SpaceOAR and fiducial placement to be done here at our cancer center, followed by simulation and same-day MRI pelvis with contrast approximately 1 week after fiducial/SpaceOAR. This will likely be sometime in May. In the interim the patient knows of remain available should he have any additional questions or concerns regarding the above plan.     Thank you very much for the referral and for the opportunity provide care for this patient.     Thank you very much for the referral and for the opportunity to provide care for this patient. A combined total of 65 minutes was spent in preparing this consultation as well as in meeting with the patient and his wife in person. Please excuse any typos in this consultation note as voice dictation was used.     ORDERS:    1) Coordinate SpaceOAR and Fiducials for 5/20  2) Ativan 1mg PO x 1, Percocet 10/325 PO x 1, and Levaquin 500 mg PO x 1 premedication all on day or procedure  3) MRI pelvis with and without contrast       Tish Gibbs MD PHD  Radiation Oncologist, [address] [place] [address] Director    Department [place] [address]

## 2022-04-26 NOTE — PROGRESS NOTES
RADIATION ONCOLOGY  SpaceOar Gel & Fiducial Marker Procedure  Patient Education     If on Anti-Coagulants, discontinue them 48 hours prior to the procedure.  You are to complete 1 Fleets Enemas the evening before the procedure and 1 Fleets enema the morning of the procedure.  You are to take prescribed Percocet and Ativan one hour before the procedure and one dose of  prescribed antibiotic two hours before procedure.  You are to have someone drive you to and from the [facility].  Nursing will call you the day before the procedure to verify that all instructions were followed.  You will have a CT scan after the procedure.  You will be scheduled for CT Simulation approximately 10 days after the SpacerOar Gel and Fiducial Marker procedure.  Before leaving the [facility], you will need to urinate. [x] Instructions provided to patient and patient verbalized understanding.

## 2022-05-16 RX ORDER — OXYCODONE AND ACETAMINOPHEN 10; 325 MG/1; MG/1
1 TABLET ORAL ONCE
Qty: 1 TABLET | Refills: 0 | Status: SHIPPED | OUTPATIENT
Start: 2022-05-16 | End: 2022-05-16

## 2022-05-20 ENCOUNTER — HOSPITAL ENCOUNTER (OUTPATIENT)
Dept: RADIATION ONCOLOGY | Age: 77
Discharge: HOME | End: 2022-05-20
Attending: RADIOLOGY
Payer: MEDICARE

## 2022-05-20 ENCOUNTER — HOSPITAL ENCOUNTER (OUTPATIENT)
Dept: RADIATION ONCOLOGY | Age: 77
Discharge: HOME | End: 2022-05-20
Payer: MEDICARE

## 2022-05-20 DIAGNOSIS — C61 PROSTATE CANCER (HCC): Primary | ICD-10-CM

## 2022-05-20 PROCEDURE — 99999 PR OFFICE/OUTPT VISIT,PROCEDURE ONLY: CPT | Performed by: RADIOLOGY

## 2022-05-20 PROCEDURE — 55874 TPRNL PLMT BIODEGRDABL MATRL: CPT | Performed by: RADIOLOGY

## 2022-05-20 PROCEDURE — 76942 ECHO GUIDE FOR BIOPSY: CPT | Performed by: RADIOLOGY

## 2022-05-20 PROCEDURE — 55876 PLACE RT DEVICE/MARKER PROS: CPT | Performed by: RADIOLOGY

## 2022-05-20 PROCEDURE — A4648 IMPLANTABLE TISSUE MARKER: HCPCS

## 2022-05-20 NOTE — PROCEDURES
[facility]           Radiation Oncology      [address] [facility]        [address]        Lorrie Cogan: [phone]        F: [phone]       [employer]                   Dr. Angelica Porras MD PhD    PROCEDURE NOTE     Date of Service: 2022  Patient ID: Nano Escalante   : 1945  MRN: [id number]   Acct Number: [de-identified]     Nano Escalante  65 y.o.   1945    REFERRING PROVIDER: Eileen Wick    PCP:  Ann Hernandez    DIAGNOSIS: Favorable intermediate risk prostate adenocarcinoma    STAGING: Cancer Staging  Prostate cancer [facility])  Staging form: Prostate, AJCC 8th Edition  - Clinical: Stage IIB (cT1c, cN0, cM0, PSA: 2.3, Grade Group: 2) - Signed by Angelica Porras MD on 2022      PROCEDURE: The patient was placed in the dorsal lithotomy position on the examination table. Genitalia were mobilized out of the way, and topical EMLA cream was applied over the perineum to reduce skin sensitivity. The perineum was then sterilized with Betadine. The perineum was anesthetized with 1% lidocaine for injection with epinephrine injecting a total of 5 cc of a solution within the superficial subcutaneous tissues of the perineum to help create a superficial block. I then placed the rectal probe in position so the prostate was easily visualized, and once the prostate was in place and midline, I locked the ultrasound probe into the stepper unit. A prostate apical block was then performed using the remaining 1% lidocaine with epinephrine injecting an additional 5 to 6 cc into the prostate apex along the expected needle tracks and near the pudendal nerves. I then placed a total of 3 fiducial markers in the prostate using [employer] [place] Fiducials. One 25 gauge needle was inserted into the right half of the gland and a marker was placed mid gland.  Similarly, two 22 gauge needles were placed in the left half of the gland for placement of one marker at the left apex and one marker at the left base. An effort was made to place them at least 2 cm apart as allowed by prostate anatomy, and not near the capsule or urethra. At this point a needle was positioned in the periprosthetic fat between the prostate and rectum and 1-2 cc of sterile normal saline was injected to separate the tissue planes. An additional 1 to 2 cc of buffered lidocaine solution was injected along the similar plane done under sagittal and axial imaging. After verifying positioning of the needle tip at the midpoint of the prostate, 10 cc of SpaceOAR regions were injected over a 20 second time span, while observing under sagittal ultrasound. Fiducial marker placement in the distribution of gel was inspected by ultrasound after completion. The patient had no difficulties with the procedure. When finished the ultrasound probe was removed and we cleaned the perineum. The patient then had a CT scan in our department simulation room to verify positioning of fiducials and SpaceOAR gel. The patient was discharged home after successfully voiding. The patient will return in approximately 1 week for CT simulation and MRI same day, allowing sufficient time for the fiducials to scar in place and avoid potential migration between the time of simulation and treatment. In the interim the patient knows the remain available should he have any additional questions or concerns regarding above plan.       Seth Grigsby MD PHD  Radiation Oncologist, [facility] Director    Department of [address] [place] [address]  [address]

## 2022-06-01 ENCOUNTER — HOSPITAL ENCOUNTER (OUTPATIENT)
Dept: RADIATION ONCOLOGY | Age: 77
Discharge: HOME | End: 2022-06-01
Payer: MEDICARE

## 2022-06-01 ENCOUNTER — HOSPITAL ENCOUNTER (OUTPATIENT)
Dept: MRI IMAGING | Age: 77
Discharge: HOME | End: 2022-06-03
Payer: MEDICARE

## 2022-06-01 ENCOUNTER — HOSPITAL ENCOUNTER (OUTPATIENT)
Dept: RADIATION ONCOLOGY | Age: 77
Discharge: HOME | End: 2022-06-01
Attending: RADIOLOGY
Payer: MEDICARE

## 2022-06-01 DIAGNOSIS — C61 PROSTATE CANCER (HCC): ICD-10-CM

## 2022-06-01 DIAGNOSIS — C61 PROSTATE CANCER (HCC): Primary | ICD-10-CM

## 2022-06-01 PROCEDURE — 72197 MRI PELVIS W/O & W/DYE: CPT

## 2022-06-01 PROCEDURE — A9579 GAD-BASE MR CONTRAST NOS,1ML: HCPCS | Performed by: RADIOLOGY

## 2022-06-01 PROCEDURE — 77334 RADIATION TREATMENT AID(S): CPT | Performed by: RADIOLOGY

## 2022-06-01 PROCEDURE — 6360000004 HC RX CONTRAST MEDICATION: Performed by: RADIOLOGY

## 2022-06-01 PROCEDURE — 77399 UNLISTED PX MED RADJ PHYSICS: CPT | Performed by: RADIOLOGY

## 2022-06-01 PROCEDURE — 77263 THER RADIOLOGY TX PLNG CPLX: CPT | Performed by: RADIOLOGY

## 2022-06-01 RX ORDER — SODIUM CHLORIDE 0.9 % (FLUSH) 0.9 %
10 SYRINGE (ML) INJECTION ONCE
Status: DISCONTINUED | OUTPATIENT
Start: 2022-06-01 | End: 2022-06-04 | Stop reason: HOSPADM

## 2022-06-01 RX ORDER — SODIUM CHLORIDE 9 MG/ML
10 INJECTION INTRAVENOUS ONCE
Status: DISCONTINUED | OUTPATIENT
Start: 2022-06-01 | End: 2022-06-04 | Stop reason: HOSPADM

## 2022-06-01 RX ADMIN — GADOTERIDOL 15 ML: 279.3 INJECTION, SOLUTION INTRAVENOUS at 11:22

## 2022-06-01 NOTE — PROGRESS NOTES
Teaching & Instructions - Prostate  General  Simulation  Initial Treatment  Self-Care Info  Nutrition  Social Service    Site-Specific  Side Effects  Cystitis Mgmt  Bowel Mgmt  Perineal Care  Proctitis Mgmt  Sexuality Issues    Other/Prevention  Full bladder  Empty rectum  Gas-x    Educational Handouts  Radiation Therapy & You  Site Specific Instructions  Radiation Oncology Dept Information  CBMS Program      Patient eager to learn, verbalized understanding of verbal education and handouts. RADIATION THERAPY BARRIERS ASSESSMENT      During your CT Simulation, you were placed in the position that you be in for your radiation treatments. You will be will in this position for approximately 10-15 minutes, do you have any concerns about staying in this position for the required time? no    While you are on treatment, you will be evaluated by the Radiation Oncologist once a week on Thursdays. The focus of these appointments will be management of side effects. You will be here for a minimum of one hour or more depending on your specific needs. Do you have any concerns about your ability to keep this appointment? no    You will be coming to the [facility] for 28    daily treatments, do you have any concerns about transportation?    no    Do you have any financial concerns about your treatment that you would like to further discuss with social work? no    Do you have any other concerns or for see any concerns or potential barriers to completing your radiation therapy that you want us to be aware of? no

## 2022-06-04 PROCEDURE — 77301 RADIOTHERAPY DOSE PLAN IMRT: CPT | Performed by: RADIOLOGY

## 2022-06-04 PROCEDURE — 77300 RADIATION THERAPY DOSE PLAN: CPT | Performed by: RADIOLOGY

## 2022-06-04 PROCEDURE — 77338 DESIGN MLC DEVICE FOR IMRT: CPT | Performed by: RADIOLOGY

## 2022-06-14 ENCOUNTER — HOSPITAL ENCOUNTER (OUTPATIENT)
Dept: RADIATION ONCOLOGY | Age: 77
Discharge: HOME | End: 2022-06-14
Payer: MEDICARE

## 2022-06-14 PROCEDURE — 77385 HC NTSTY MODUL RAD TX DLVR SMPL: CPT | Performed by: RADIOLOGY

## 2022-06-14 PROCEDURE — G6002 STEREOSCOPIC X-RAY GUIDANCE: HCPCS | Performed by: RADIOLOGY

## 2022-06-15 ENCOUNTER — HOSPITAL ENCOUNTER (OUTPATIENT)
Dept: RADIATION ONCOLOGY | Age: 77
Discharge: HOME | End: 2022-06-15
Payer: MEDICARE

## 2022-06-15 ENCOUNTER — HOSPITAL ENCOUNTER (OUTPATIENT)
Dept: RADIATION ONCOLOGY | Age: 77
Discharge: HOME | End: 2022-06-15
Attending: RADIOLOGY
Payer: MEDICARE

## 2022-06-15 VITALS
HEART RATE: 63 BPM | SYSTOLIC BLOOD PRESSURE: 123 MMHG | DIASTOLIC BLOOD PRESSURE: 62 MMHG | WEIGHT: 152.6 LBS | TEMPERATURE: 97.5 F | RESPIRATION RATE: 16 BRPM | BODY MASS INDEX: 23.2 KG/M2 | OXYGEN SATURATION: 98 %

## 2022-06-15 PROCEDURE — G6002 STEREOSCOPIC X-RAY GUIDANCE: HCPCS | Performed by: RADIOLOGY

## 2022-06-15 PROCEDURE — 77385 HC NTSTY MODUL RAD TX DLVR SMPL: CPT | Performed by: RADIOLOGY

## 2022-06-15 RX ORDER — LOPERAMIDE HYDROCHLORIDE 2 MG/1
2 CAPSULE ORAL 4 TIMES DAILY PRN
COMMUNITY

## 2022-06-15 NOTE — PROGRESS NOTES
17 [facility]           Radiation Oncology      [address] [facility], [address]        Michel Asa: [phone]        F: [phone]       [email or web address]                   Dr. Nimesh Frey MD PhD    ON TREATMENT VISIT (OTV) NOTE     Date of Service: 6/15/2022  Patient ID: Audrey Rey   : 1945  MRN: [id number]   Acct Number: [de-identified]       DIAGNOSIS:  Cancer Staging  Prostate cancer [facility])  Staging form: Prostate, AJCC 8th Edition  - Clinical: Stage IIB (cT1c, cN0, cM0, PSA: 2.3, Grade Group: 2) - Signed by Nimesh Frey MD on 2022      Treatment Area: Pelvis- Male    Current Total Dose(cGy): 500  Current Fraction: 2    Patient was seen today for weekly visit. Wt Readings from Last 3 Encounters:   06/15/22 152 lb 9.6 oz (69.2 kg)   22 160 lb (72.6 kg)   22 160 lb 6.4 oz (72.8 kg)       /62   Pulse 63   Temp 97.5 °F (36.4 °C)   Resp 16   Wt 152 lb 9.6 oz (69.2 kg)   SpO2 98%   BMI 23.20 kg/m²     Lab Results   Component Value Date    WBC 5.1 2021     2021       Comfort Alteration  Fatigue:None, able to perform daily activities    Pain Location: none  Pain Intensity (Current): 0 No Pain  Pain Treatment: OTC Medications  Pain Relief: n/a    Emotional Alteration:   Coping: effective    Nutritional Alteration  Anorexia: none   Nausea: No nausea noted  Vomiting: No vomiting     Skin Alteration   Skin reaction: No changes noted    Elimination Alterations  Urinary Frequency: Urinating less than once an hour  Urinary Retention: Normal  Urinary Incontinence: None  Dysuria: None  Nocturia: 1-3 times nightly, normally once a night  Proctitis: None  Diarrhea: watery stool this am, took imodium  Comments: . Additional Comments: Shakeel Cao     MEDICATIONS:     Current Outpatient Medications   Medication Sig Dispense Refill    Semaglutide (RYBELSUS) 7 MG TABS Take 7 mg by mouth daily      empagliflozin (JARDIANCE) 10 MG tablet Take 10 mg by mouth daily      Cholecalciferol (VITAMIN D3) 50 MCG ( UT) CAPS Take 2,000 Units by mouth daily      nitroGLYCERIN (NITROSTAT) 0.4 MG SL tablet Place 0.4 mg under the tongue every 5 minutes as needed for Chest pain up to max of 3 total doses. If no relief after 1 dose, call 911.  metoprolol succinate (TOPROL XL) 50 MG extended release tablet Take 50 mg by mouth daily      losartan (COZAAR) 25 MG tablet Take 25 mg by mouth daily      Icosapent Ethyl (VASCEPA) 1 g CAPS capsule Take 2 capsules by mouth 2 times daily      hydroCHLOROthiazide (HYDRODIURIL) 25 MG tablet Take 25 mg by mouth daily      finasteride (PROSCAR) 5 MG tablet Take 5 mg by mouth daily      atorvastatin (LIPITOR) 80 MG tablet Take 80 mg by mouth daily      BABY ASPIRIN PO Take 81 mg by mouth daily      alfuzosin (UROXATRAL) 10 MG extended release tablet Take 10 mg by mouth daily      metFORMIN (GLUCOPHAGE) 500 MG tablet Take 1,000 mg by mouth daily        No current facility-administered medications for this encounter. * New    PHYSICAL EXAM:       ECO - Symptomatic but completely ambulatory (Restricted in physically strenuous activity but ambulatory and able to carry out work of a light or sedentary nature. For example, light housework, office work)     General: NAD, AO x 3, Mentation is clear with appropriate affect. HEENT: Normocephalic, atraumatic  Thorax:  Unlabored  Abdomen:  Non-distended    Chemotherapy Update: None    Treatment Imaging: CBCT    ASSESSMENT: No significant radiation side effects. Responding appropriately to symptomatic management. New medications, diagnostic results: Continue treatment as planned    PLAN: Again reviewed potential side effects of radiation for the patient's treatment. Continue local/topical care. Continue current radiation course as prescribed.

## 2022-06-16 ENCOUNTER — HOSPITAL ENCOUNTER (OUTPATIENT)
Dept: RADIATION ONCOLOGY | Age: 77
Discharge: HOME | End: 2022-06-16
Payer: MEDICARE

## 2022-06-16 PROCEDURE — G6002 STEREOSCOPIC X-RAY GUIDANCE: HCPCS | Performed by: RADIOLOGY

## 2022-06-16 PROCEDURE — 77385 HC NTSTY MODUL RAD TX DLVR SMPL: CPT | Performed by: RADIOLOGY

## 2022-06-17 ENCOUNTER — HOSPITAL ENCOUNTER (OUTPATIENT)
Dept: RADIATION ONCOLOGY | Age: 77
Discharge: HOME | End: 2022-06-17
Payer: MEDICARE

## 2022-06-17 PROCEDURE — 77385 HC NTSTY MODUL RAD TX DLVR SMPL: CPT | Performed by: RADIOLOGY

## 2022-06-17 PROCEDURE — G6002 STEREOSCOPIC X-RAY GUIDANCE: HCPCS | Performed by: RADIOLOGY

## 2022-06-20 ENCOUNTER — HOSPITAL ENCOUNTER (OUTPATIENT)
Dept: RADIATION ONCOLOGY | Age: 77
Discharge: HOME | End: 2022-06-20
Payer: MEDICARE

## 2022-06-20 PROCEDURE — 77385 HC NTSTY MODUL RAD TX DLVR SMPL: CPT | Performed by: RADIOLOGY

## 2022-06-20 PROCEDURE — 77427 RADIATION TX MANAGEMENT X5: CPT | Performed by: RADIOLOGY

## 2022-06-20 PROCEDURE — G6002 STEREOSCOPIC X-RAY GUIDANCE: HCPCS | Performed by: RADIOLOGY

## 2022-06-20 PROCEDURE — 77336 RADIATION PHYSICS CONSULT: CPT | Performed by: RADIOLOGY

## 2022-06-21 ENCOUNTER — HOSPITAL ENCOUNTER (OUTPATIENT)
Dept: RADIATION ONCOLOGY | Age: 77
Discharge: HOME | End: 2022-06-21
Payer: MEDICARE

## 2022-06-21 PROCEDURE — G6002 STEREOSCOPIC X-RAY GUIDANCE: HCPCS | Performed by: RADIOLOGY

## 2022-06-21 PROCEDURE — 77385 HC NTSTY MODUL RAD TX DLVR SMPL: CPT | Performed by: RADIOLOGY

## 2022-06-22 ENCOUNTER — HOSPITAL ENCOUNTER (OUTPATIENT)
Dept: RADIATION ONCOLOGY | Age: 77
Discharge: HOME | End: 2022-06-22
Payer: MEDICARE

## 2022-06-22 PROCEDURE — G6002 STEREOSCOPIC X-RAY GUIDANCE: HCPCS | Performed by: RADIOLOGY

## 2022-06-22 PROCEDURE — 77385 HC NTSTY MODUL RAD TX DLVR SMPL: CPT | Performed by: RADIOLOGY

## 2022-06-23 ENCOUNTER — HOSPITAL ENCOUNTER (OUTPATIENT)
Dept: RADIATION ONCOLOGY | Age: 77
Discharge: HOME | End: 2022-06-23
Attending: RADIOLOGY
Payer: MEDICARE

## 2022-06-23 ENCOUNTER — HOSPITAL ENCOUNTER (OUTPATIENT)
Dept: RADIATION ONCOLOGY | Age: 77
Discharge: HOME | End: 2022-06-23
Payer: MEDICARE

## 2022-06-23 VITALS
DIASTOLIC BLOOD PRESSURE: 70 MMHG | WEIGHT: 147 LBS | TEMPERATURE: 97 F | BODY MASS INDEX: 22.35 KG/M2 | SYSTOLIC BLOOD PRESSURE: 135 MMHG | HEART RATE: 61 BPM | OXYGEN SATURATION: 98 % | RESPIRATION RATE: 16 BRPM

## 2022-06-23 PROCEDURE — G6002 STEREOSCOPIC X-RAY GUIDANCE: HCPCS | Performed by: RADIOLOGY

## 2022-06-23 PROCEDURE — 77385 HC NTSTY MODUL RAD TX DLVR SMPL: CPT | Performed by: RADIOLOGY

## 2022-06-23 NOTE — PROGRESS NOTES
17 [facility]           Radiation Oncology      [address] [facility], [address]        Emre Stoutsvilles: [phone]        F: [phone]       [employer]                   Dr. Johan Abebe MD PhD    ON TREATMENT VISIT (OTV) NOTE     Date of Service: 2022  Patient ID: Danny Krishnamurthy   : 1945  MRN: [id number]   Acct Number: [de-identified]       DIAGNOSIS:  Cancer Staging  Prostate cancer [facility])  Staging form: Prostate, AJCC 8th Edition  - Clinical: Stage IIB (cT1c, cN0, cM0, PSA: 2.3, Grade Group: 2) - Signed by Johan Abebe MD on 2022      Treatment Area: Pelvis- Male    Current Total Dose(cGy): 2000 cGy  Current Fraction: 8    Patient was seen today for weekly visit.      Wt Readings from Last 3 Encounters:   22 147 lb (66.7 kg)   06/15/22 152 lb 9.6 oz (69.2 kg)   22 160 lb (72.6 kg)       /70   Pulse 61   Temp 97 °F (36.1 °C)   Resp 16   Wt 147 lb (66.7 kg)   SpO2 98%   BMI 22.35 kg/m²     Lab Results   Component Value Date    WBC 5.1 2021     2021       Comfort Alteration  Fatigue:None, able to perform daily activities    Pain Location: Some bladder pressure 2-3 times weekly that is relieved with urination  Pain Intensity (Current): 0 No Pain  Pain Treatment: N/A  Pain Relief: n/a    Emotional Alteration:   Coping: effective    Nutritional Alteration  Anorexia: none   Nausea: No nausea noted  Vomiting: No vomiting     Skin Alteration   Skin reaction: No changes noted    Elimination Alterations  Urinary Frequency: Urinating less than once an hour  Urinary Retention: in the past week has had a couple episodes of some urinary hesitancy  Urinary Incontinence: None  Dysuria: in the past week has had a couple of episode of burning in the penis with urination that subsides when stream starts  Nocturia: Normally 1-2, but once this past week got up 4 times to urinate  Proctitis: None  Diarrhea: None this week  Comments:     Additional Comments:     MEDICATIONS:     Current Outpatient Medications   Medication Sig Dispense Refill    loperamide (IMODIUM) 2 MG capsule Take 2 mg by mouth 4 times daily as needed for Diarrhea      Semaglutide (RYBELSUS) 7 MG TABS Take 7 mg by mouth daily      empagliflozin (JARDIANCE) 10 MG tablet Take 10 mg by mouth daily      Cholecalciferol (VITAMIN D3) 50 MCG (2000 UT) CAPS Take 2,000 Units by mouth daily      nitroGLYCERIN (NITROSTAT) 0.4 MG SL tablet Place 0.4 mg under the tongue every 5 minutes as needed for Chest pain up to max of 3 total doses. If no relief after 1 dose, call 911.  metoprolol succinate (TOPROL XL) 50 MG extended release tablet Take 50 mg by mouth daily      losartan (COZAAR) 25 MG tablet Take 25 mg by mouth daily (Patient not taking: Reported on 2022)      Icosapent Ethyl (VASCEPA) 1 g CAPS capsule Take 2 capsules by mouth 2 times daily      hydroCHLOROthiazide (HYDRODIURIL) 25 MG tablet Take 25 mg by mouth daily      finasteride (PROSCAR) 5 MG tablet Take 5 mg by mouth daily      atorvastatin (LIPITOR) 80 MG tablet Take 80 mg by mouth daily      BABY ASPIRIN PO Take 81 mg by mouth daily      alfuzosin (UROXATRAL) 10 MG extended release tablet Take 10 mg by mouth daily      metFORMIN (GLUCOPHAGE) 500 MG tablet Take 1,000 mg by mouth daily        No current facility-administered medications for this encounter. * New    PHYSICAL EXAM:       ECO - Symptomatic but completely ambulatory (Restricted in physically strenuous activity but ambulatory and able to carry out work of a light or sedentary nature. For example, light housework, office work)     General: NAD, AO x 3, Mentation is clear with appropriate affect. HEENT: Normocephalic, atraumatic  Thorax:  Unlabored  Abdomen:  Non-distended    Chemotherapy Update: None    Treatment Imaging: CBCT    ASSESSMENT: Minimal radiation side effects. Responding appropriately to symptomatic management.     New medications, diagnostic results: Continue treatment as planned    PLAN: Again reviewed potential side effects of radiation for the patient's treatment. Continue local/topical care. Continue current radiation course as prescribed. Patient reports transient mild dysuria at start of stream that happens on occasion, it is not bothersome so will monitor for now. He has not had any loose BMs this week. He has had a few episodes of increased nocturia this week so I discussed restricting water after dinnertime to see if this helps. Should this worsen then we will discuss Flomax.

## 2022-06-24 ENCOUNTER — HOSPITAL ENCOUNTER (OUTPATIENT)
Dept: RADIATION ONCOLOGY | Age: 77
Discharge: HOME | End: 2022-06-24
Payer: MEDICARE

## 2022-06-24 PROCEDURE — 77385 HC NTSTY MODUL RAD TX DLVR SMPL: CPT | Performed by: RADIOLOGY

## 2022-06-24 PROCEDURE — 77336 RADIATION PHYSICS CONSULT: CPT | Performed by: RADIOLOGY

## 2022-06-24 PROCEDURE — G6002 STEREOSCOPIC X-RAY GUIDANCE: HCPCS | Performed by: RADIOLOGY

## 2022-06-27 ENCOUNTER — HOSPITAL ENCOUNTER (OUTPATIENT)
Dept: RADIATION ONCOLOGY | Age: 77
Discharge: HOME | End: 2022-06-27
Payer: MEDICARE

## 2022-06-27 PROCEDURE — 77385 HC NTSTY MODUL RAD TX DLVR SMPL: CPT | Performed by: RADIOLOGY

## 2022-06-27 PROCEDURE — G6002 STEREOSCOPIC X-RAY GUIDANCE: HCPCS | Performed by: RADIOLOGY

## 2022-06-27 PROCEDURE — 77427 RADIATION TX MANAGEMENT X5: CPT | Performed by: RADIOLOGY

## 2022-06-28 ENCOUNTER — HOSPITAL ENCOUNTER (OUTPATIENT)
Dept: RADIATION ONCOLOGY | Age: 77
Discharge: HOME | End: 2022-06-28
Payer: MEDICARE

## 2022-06-28 PROCEDURE — 77385 HC NTSTY MODUL RAD TX DLVR SMPL: CPT | Performed by: RADIOLOGY

## 2022-06-28 PROCEDURE — G6002 STEREOSCOPIC X-RAY GUIDANCE: HCPCS | Performed by: RADIOLOGY

## 2022-06-29 ENCOUNTER — HOSPITAL ENCOUNTER (OUTPATIENT)
Dept: RADIATION ONCOLOGY | Age: 77
Discharge: HOME | End: 2022-06-29
Payer: MEDICARE

## 2022-06-29 PROCEDURE — G6002 STEREOSCOPIC X-RAY GUIDANCE: HCPCS | Performed by: RADIOLOGY

## 2022-06-29 PROCEDURE — 77385 HC NTSTY MODUL RAD TX DLVR SMPL: CPT | Performed by: RADIOLOGY

## 2022-06-30 ENCOUNTER — HOSPITAL ENCOUNTER (OUTPATIENT)
Dept: RADIATION ONCOLOGY | Age: 77
Discharge: HOME | End: 2022-06-30
Payer: MEDICARE

## 2022-06-30 ENCOUNTER — HOSPITAL ENCOUNTER (OUTPATIENT)
Dept: RADIATION ONCOLOGY | Age: 77
Discharge: HOME | End: 2022-06-30
Attending: RADIOLOGY
Payer: MEDICARE

## 2022-06-30 VITALS
BODY MASS INDEX: 22.5 KG/M2 | WEIGHT: 148 LBS | HEART RATE: 72 BPM | DIASTOLIC BLOOD PRESSURE: 63 MMHG | TEMPERATURE: 97.5 F | SYSTOLIC BLOOD PRESSURE: 135 MMHG | RESPIRATION RATE: 18 BRPM | OXYGEN SATURATION: 98 %

## 2022-06-30 PROCEDURE — 77385 HC NTSTY MODUL RAD TX DLVR SMPL: CPT | Performed by: RADIOLOGY

## 2022-06-30 PROCEDURE — G6002 STEREOSCOPIC X-RAY GUIDANCE: HCPCS | Performed by: RADIOLOGY

## 2022-06-30 NOTE — PROGRESS NOTES
17 [facility]           Radiation Oncology      [address] [facility]        [address]        Albert [phone]        F: [phone]       [email or web address]                   Dr. Agnes Peralta MD PhD    ON TREATMENT VISIT (OTV) NOTE     Date of Service: 2022  Patient ID: Agatha Evans   : 1945  MRN: [id number]   Acct Number: [de-identified]       DIAGNOSIS:  Cancer Staging  Prostate cancer [facility])  Staging form: Prostate, AJCC 8th Edition  - Clinical: Stage IIB (cT1c, cN0, cM0, PSA: 2.3, Grade Group: 2) - Signed by Agnes Peralta MD on 2022      Treatment Area: Pelvis- Male    Current Total Dose(cGy): 3250 cGy  Current Fraction: 13    Patient was seen today for weekly visit. Wt Readings from Last 3 Encounters:   22 148 lb (67.1 kg)   22 147 lb (66.7 kg)   06/15/22 152 lb 9.6 oz (69.2 kg)       /63   Pulse 72   Temp 97.5 °F (36.4 °C)   Resp 18   Wt 148 lb (67.1 kg)   SpO2 98%   BMI 22.50 kg/m²     Lab Results   Component Value Date    WBC 5.1 2021     2021       Comfort Alteration  Fatigue:None, able to perform daily activities    Pain Location: Bladder bloating/pressure for the last several days.   Pain Intensity (Current): 0 No Pain  Pain Treatment: N/A  Pain Relief: n/a    Emotional Alteration:   Coping: effective    Nutritional Alteration  Anorexia: none   Nausea: No nausea noted  Vomiting: No vomiting     Skin Alteration   Skin reaction: No changes noted    Elimination Alterations  Urinary Frequency: Urinating at least once an hour  Urinary Retention: 2-3 episodes of urinary hesitancy this past week  Urinary Incontinence: 2-3 times of urge incontinence this past week  Dysuria: couple episodes of burning with urination that subsides after stream starts  Nocturia: Only once this past week  Proctitis: None  Diarrhea: None, stool is soft  Comments:     Additional Comments:     MEDICATIONS:     Current Outpatient Medications Medication Sig Dispense Refill    loperamide (IMODIUM) 2 MG capsule Take 2 mg by mouth 4 times daily as needed for Diarrhea      Semaglutide (RYBELSUS) 7 MG TABS Take 7 mg by mouth daily      empagliflozin (JARDIANCE) 10 MG tablet Take 10 mg by mouth daily      Cholecalciferol (VITAMIN D3) 50 MCG ( UT) CAPS Take 2,000 Units by mouth daily      nitroGLYCERIN (NITROSTAT) 0.4 MG SL tablet Place 0.4 mg under the tongue every 5 minutes as needed for Chest pain up to max of 3 total doses. If no relief after 1 dose, call 911.  metoprolol succinate (TOPROL XL) 50 MG extended release tablet Take 50 mg by mouth daily      losartan (COZAAR) 25 MG tablet Take 25 mg by mouth daily (Patient not taking: Reported on 2022)      Icosapent Ethyl (VASCEPA) 1 g CAPS capsule Take 2 capsules by mouth 2 times daily      hydroCHLOROthiazide (HYDRODIURIL) 25 MG tablet Take 25 mg by mouth daily      finasteride (PROSCAR) 5 MG tablet Take 5 mg by mouth daily      atorvastatin (LIPITOR) 80 MG tablet Take 80 mg by mouth daily      BABY ASPIRIN PO Take 81 mg by mouth daily      alfuzosin (UROXATRAL) 10 MG extended release tablet Take 10 mg by mouth daily (Patient not taking: Reported on 2022)      metFORMIN (GLUCOPHAGE) 500 MG tablet Take 1,000 mg by mouth daily        No current facility-administered medications for this encounter. * New    PHYSICAL EXAM:       ECO - Symptomatic but completely ambulatory (Restricted in physically strenuous activity but ambulatory and able to carry out work of a light or sedentary nature. For example, light housework, office work)     General: NAD, AO x 3, Mentation is clear with appropriate affect. HEENT: Normocephalic, atraumatic  Thorax:  Unlabored  Abdomen:  Non-distended    Chemotherapy Update: None    Treatment Imaging: CBCT    ASSESSMENT: Minimal radiation side effects. Responding appropriately to symptomatic management.     New medications, diagnostic results:

## 2022-07-01 ENCOUNTER — HOSPITAL ENCOUNTER (OUTPATIENT)
Dept: RADIATION ONCOLOGY | Age: 77
Discharge: HOME | End: 2022-07-01
Payer: MEDICARE

## 2022-07-01 PROCEDURE — 77336 RADIATION PHYSICS CONSULT: CPT | Performed by: RADIOLOGY

## 2022-07-01 PROCEDURE — G6002 STEREOSCOPIC X-RAY GUIDANCE: HCPCS | Performed by: RADIOLOGY

## 2022-07-01 PROCEDURE — 77385 HC NTSTY MODUL RAD TX DLVR SMPL: CPT | Performed by: RADIOLOGY

## 2022-07-05 ENCOUNTER — HOSPITAL ENCOUNTER (OUTPATIENT)
Dept: RADIATION ONCOLOGY | Age: 77
Discharge: HOME | End: 2022-07-05
Payer: MEDICARE

## 2022-07-05 PROCEDURE — G6002 STEREOSCOPIC X-RAY GUIDANCE: HCPCS | Performed by: RADIOLOGY

## 2022-07-05 PROCEDURE — 77385 HC NTSTY MODUL RAD TX DLVR SMPL: CPT | Performed by: RADIOLOGY

## 2022-07-05 PROCEDURE — 77427 RADIATION TX MANAGEMENT X5: CPT | Performed by: RADIOLOGY

## 2022-07-05 NOTE — PROGRESS NOTES
Pura Keiry   [id number]  1945   Patient Mid-Point Distress Screening Form   Please select the number that describes how much distress you have experiened in the past week (0-10): 0    Please select the number that descrbes how much distress you have experienced today (0-10): 0    If you responded with a score of 6 or above to the first tow questions; please address the following concerns:    Practical Concerns 0-10 Comment        Work, Concerns about Job          Finances, Bills, Insurance          Housing          Transportation          Availability of Caregiver     Family Concerns          Dealing with Children          Dealing with Partner          Ability to have [address] Issues     Emotional Concerns          Sadness, Depression          Anxiety, Worry, Fear          Concerns about Appearance          Loss of Interest in Usual Activities     Spiritual Concerns          Connection to a higher power          Sense of meaning and purpose          Support for my spiritual community     Physical Concerns          Nausea          Eating, Loss of Appetite          Pain          Fatigue       Other Concerns/Notes:Pt states a distress level of zero at this mid-way point in Radiation Treatment. He states he does \"not worry about what I can't change or have control over\". He states generally handles life's challenges well. He states no current needs, issues or concerns. Date of visit: No admission date for patient encounter.       : PATRICIA Hayward

## 2022-07-06 ENCOUNTER — HOSPITAL ENCOUNTER (OUTPATIENT)
Dept: RADIATION ONCOLOGY | Age: 77
Discharge: HOME | End: 2022-07-06
Payer: MEDICARE

## 2022-07-06 VITALS
RESPIRATION RATE: 16 BRPM | OXYGEN SATURATION: 99 % | BODY MASS INDEX: 22.53 KG/M2 | WEIGHT: 148.2 LBS | SYSTOLIC BLOOD PRESSURE: 120 MMHG | DIASTOLIC BLOOD PRESSURE: 64 MMHG | HEART RATE: 56 BPM | TEMPERATURE: 96.3 F

## 2022-07-06 PROCEDURE — G6002 STEREOSCOPIC X-RAY GUIDANCE: HCPCS | Performed by: RADIOLOGY

## 2022-07-06 PROCEDURE — 77385 HC NTSTY MODUL RAD TX DLVR SMPL: CPT | Performed by: RADIOLOGY

## 2022-07-06 NOTE — PROGRESS NOTES
17 [facility]           Radiation Oncology      [address] [facility]        [place], [address] Jenna Beaver: [phone]        F: [phone]       [email or web address]                   Dr. Portia Juarez MD PhD    ON TREATMENT VISIT (OTV) NOTE     Date of Service: 2022  Patient ID: Yvette Trivedi   : 1945  MRN: [id number]   Acct Number: [de-identified]       DIAGNOSIS:  Cancer Staging  Prostate cancer [facility])  Staging form: Prostate, AJCC 8th Edition  - Clinical: Stage IIB (cT1c, cN0, cM0, PSA: 2.3, Grade Group: 2) - Signed by Portia Juarez MD on 2022      Treatment Area: Pelvis- Male    Current Total Dose(cGy): 4000  Current Fraction: 16    Patient was seen today for weekly visit. Wt Readings from Last 3 Encounters:   22 148 lb 3.2 oz (67.2 kg)   22 148 lb (67.1 kg)   22 147 lb (66.7 kg)       /64   Pulse 56   Temp (!) 96.3 °F (35.7 °C)   Resp 16   Wt 148 lb 3.2 oz (67.2 kg)   SpO2 99%   BMI 22.53 kg/m²     Lab Results   Component Value Date    WBC 5.1 2021     2021       Comfort Alteration  Fatigue:None, able to perform daily activities    Pain Location: bladder bloating on occasion  Pain Intensity (Current): 0 No Pain  Pain Treatment: No treatment scheduled  Pain Relief: n/a    Emotional Alteration:   Coping: effective    Nutritional Alteration  Anorexia: none eats lunch and dinner  Nausea: No nausea noted  Vomiting: No vomiting     Skin Alteration   Skin reaction: No changes noted    Elimination Alterations  Urinary Frequency: during the day urinates every 1 to 1 1/2 hours, drinks alot of water  Urinary Retention: Normal  Urinary Incontinence: None  Dysuria: slight burn at the beginning of his stream  Nocturia: 1-3 times nightly, once last night  Proctitis: None  Diarrhea: softer more frequent stools  Comments: . Additional Comments: Cherelle Sigala     MEDICATIONS:     Current Outpatient Medications   Medication Sig Dispense Refill   

## 2022-07-07 ENCOUNTER — HOSPITAL ENCOUNTER (OUTPATIENT)
Dept: RADIATION ONCOLOGY | Age: 77
Discharge: HOME | End: 2022-07-07
Payer: MEDICARE

## 2022-07-07 PROCEDURE — 77385 HC NTSTY MODUL RAD TX DLVR SMPL: CPT | Performed by: RADIOLOGY

## 2022-07-07 PROCEDURE — G6002 STEREOSCOPIC X-RAY GUIDANCE: HCPCS | Performed by: RADIOLOGY

## 2022-07-08 ENCOUNTER — APPOINTMENT (OUTPATIENT)
Dept: RADIATION ONCOLOGY | Age: 77
End: 2022-07-08
Payer: MEDICARE

## 2022-07-11 ENCOUNTER — HOSPITAL ENCOUNTER (OUTPATIENT)
Dept: RADIATION ONCOLOGY | Age: 77
Discharge: HOME | End: 2022-07-11
Payer: MEDICARE

## 2022-07-11 PROCEDURE — G6002 STEREOSCOPIC X-RAY GUIDANCE: HCPCS | Performed by: RADIOLOGY

## 2022-07-11 PROCEDURE — 77385 HC NTSTY MODUL RAD TX DLVR SMPL: CPT | Performed by: RADIOLOGY

## 2022-07-12 ENCOUNTER — HOSPITAL ENCOUNTER (OUTPATIENT)
Dept: RADIATION ONCOLOGY | Age: 77
Discharge: HOME | End: 2022-07-12
Payer: MEDICARE

## 2022-07-12 PROCEDURE — G6002 STEREOSCOPIC X-RAY GUIDANCE: HCPCS | Performed by: RADIOLOGY

## 2022-07-12 PROCEDURE — 77385 HC NTSTY MODUL RAD TX DLVR SMPL: CPT | Performed by: RADIOLOGY

## 2022-07-13 ENCOUNTER — HOSPITAL ENCOUNTER (OUTPATIENT)
Dept: RADIATION ONCOLOGY | Age: 77
Discharge: HOME | End: 2022-07-13
Payer: MEDICARE

## 2022-07-13 PROCEDURE — 77336 RADIATION PHYSICS CONSULT: CPT | Performed by: RADIOLOGY

## 2022-07-13 PROCEDURE — 77385 HC NTSTY MODUL RAD TX DLVR SMPL: CPT | Performed by: RADIOLOGY

## 2022-07-13 PROCEDURE — G6002 STEREOSCOPIC X-RAY GUIDANCE: HCPCS | Performed by: RADIOLOGY

## 2022-07-13 PROCEDURE — 77427 RADIATION TX MANAGEMENT X5: CPT | Performed by: RADIOLOGY

## 2022-07-14 ENCOUNTER — HOSPITAL ENCOUNTER (OUTPATIENT)
Dept: RADIATION ONCOLOGY | Age: 77
Discharge: HOME | End: 2022-07-14
Payer: MEDICARE

## 2022-07-14 ENCOUNTER — CLINICAL DOCUMENTATION (OUTPATIENT)
Dept: RADIATION ONCOLOGY | Age: 77
End: 2022-07-14

## 2022-07-14 VITALS
RESPIRATION RATE: 16 BRPM | HEART RATE: 80 BPM | DIASTOLIC BLOOD PRESSURE: 60 MMHG | WEIGHT: 148.8 LBS | TEMPERATURE: 97.2 F | OXYGEN SATURATION: 97 % | SYSTOLIC BLOOD PRESSURE: 122 MMHG | BODY MASS INDEX: 22.62 KG/M2

## 2022-07-14 PROCEDURE — 77385 HC NTSTY MODUL RAD TX DLVR SMPL: CPT | Performed by: RADIOLOGY

## 2022-07-14 PROCEDURE — G6002 STEREOSCOPIC X-RAY GUIDANCE: HCPCS | Performed by: RADIOLOGY

## 2022-07-15 ENCOUNTER — HOSPITAL ENCOUNTER (OUTPATIENT)
Dept: RADIATION ONCOLOGY | Age: 77
Discharge: HOME | End: 2022-07-15
Payer: MEDICARE

## 2022-07-15 PROCEDURE — G6002 STEREOSCOPIC X-RAY GUIDANCE: HCPCS | Performed by: RADIOLOGY

## 2022-07-15 PROCEDURE — 77385 HC NTSTY MODUL RAD TX DLVR SMPL: CPT | Performed by: RADIOLOGY

## 2022-07-18 ENCOUNTER — HOSPITAL ENCOUNTER (OUTPATIENT)
Dept: RADIATION ONCOLOGY | Age: 77
Discharge: HOME | End: 2022-07-18
Payer: MEDICARE

## 2022-07-18 PROCEDURE — G6002 STEREOSCOPIC X-RAY GUIDANCE: HCPCS | Performed by: RADIOLOGY

## 2022-07-18 PROCEDURE — 77385 HC NTSTY MODUL RAD TX DLVR SMPL: CPT | Performed by: RADIOLOGY

## 2022-07-19 ENCOUNTER — HOSPITAL ENCOUNTER (OUTPATIENT)
Dept: RADIATION ONCOLOGY | Age: 77
Discharge: HOME | End: 2022-07-19
Payer: MEDICARE

## 2022-07-19 PROCEDURE — G6002 STEREOSCOPIC X-RAY GUIDANCE: HCPCS | Performed by: RADIOLOGY

## 2022-07-19 PROCEDURE — 77385 HC NTSTY MODUL RAD TX DLVR SMPL: CPT | Performed by: RADIOLOGY

## 2022-07-20 ENCOUNTER — HOSPITAL ENCOUNTER (OUTPATIENT)
Dept: RADIATION ONCOLOGY | Age: 77
Discharge: HOME | End: 2022-07-20
Payer: MEDICARE

## 2022-07-20 PROCEDURE — 77336 RADIATION PHYSICS CONSULT: CPT | Performed by: RADIOLOGY

## 2022-07-20 PROCEDURE — 77385 HC NTSTY MODUL RAD TX DLVR SMPL: CPT | Performed by: RADIOLOGY

## 2022-07-20 PROCEDURE — G6002 STEREOSCOPIC X-RAY GUIDANCE: HCPCS | Performed by: RADIOLOGY

## 2022-07-21 ENCOUNTER — HOSPITAL ENCOUNTER (OUTPATIENT)
Dept: RADIATION ONCOLOGY | Age: 77
Discharge: HOME | End: 2022-07-21
Payer: MEDICARE

## 2022-07-21 VITALS
HEART RATE: 60 BPM | SYSTOLIC BLOOD PRESSURE: 135 MMHG | TEMPERATURE: 97.5 F | RESPIRATION RATE: 16 BRPM | OXYGEN SATURATION: 99 % | DIASTOLIC BLOOD PRESSURE: 65 MMHG | BODY MASS INDEX: 22.17 KG/M2 | WEIGHT: 145.8 LBS

## 2022-07-21 PROCEDURE — 77427 RADIATION TX MANAGEMENT X5: CPT | Performed by: RADIOLOGY

## 2022-07-21 PROCEDURE — G6002 STEREOSCOPIC X-RAY GUIDANCE: HCPCS | Performed by: RADIOLOGY

## 2022-07-21 PROCEDURE — 77385 HC NTSTY MODUL RAD TX DLVR SMPL: CPT | Performed by: RADIOLOGY

## 2022-07-21 NOTE — PROGRESS NOTES
treatment as planned    PLAN: Again reviewed potential side effects of radiation for the patient's treatment. Continue local/topical care. Completes radiation next Monday, will see him again for quick check in 1 month. Reinforced PRN imodium and good PO hydration with fluid restriction after dinner time to help with nocturia. Continue current radiation course as prescribed.

## 2022-07-22 ENCOUNTER — HOSPITAL ENCOUNTER (OUTPATIENT)
Dept: RADIATION ONCOLOGY | Age: 77
Discharge: HOME | End: 2022-07-22
Payer: MEDICARE

## 2022-07-22 PROCEDURE — 77385 HC NTSTY MODUL RAD TX DLVR SMPL: CPT | Performed by: RADIOLOGY

## 2022-07-22 PROCEDURE — G6002 STEREOSCOPIC X-RAY GUIDANCE: HCPCS | Performed by: RADIOLOGY

## 2022-07-25 ENCOUNTER — HOSPITAL ENCOUNTER (OUTPATIENT)
Dept: RADIATION ONCOLOGY | Age: 77
Discharge: HOME | End: 2022-07-25
Payer: MEDICARE

## 2022-07-25 PROCEDURE — 77385 HC NTSTY MODUL RAD TX DLVR SMPL: CPT | Performed by: RADIOLOGY

## 2022-07-25 PROCEDURE — 77336 RADIATION PHYSICS CONSULT: CPT | Performed by: RADIOLOGY

## 2022-07-25 PROCEDURE — G6002 STEREOSCOPIC X-RAY GUIDANCE: HCPCS | Performed by: RADIOLOGY

## 2022-07-25 NOTE — PROGRESS NOTES
17 [facility]           Radiation Oncology      [address] [facility]        [address]        [facility]: [phone]        F: [phone]       [place]                   Dr. Stephane Rowley MD PhD    RADIATION TREATMENT SUMMARY NOTE     Date of Service: 2022  Patient ID: Sandra Bolanos   : 1945  MRN: [id number]   Acct Number: [de-identified]       Patient Name:  Snadra Bolanos,  1945,  65 y.o., male       Referring Physician: Beto Ricardo [address]  [place],  [address] [facility]      PCP: Karina Moya       Diagnosis:  Prostate cancer [facility])  Staging form: Prostate, AJCC 8th Edition  - Clinical: Stage IIB (cT1c, cN0, cM0, PSA: 2.3, Grade Group: 2)       Recent History: This is a 65year old male with history of diabetes mellitus, hypertension, hyperlipidemia, CKD 3, anemia, STEMI, emergent CABG x3 vessels, and more recent diagnosis of Kam 7 (3+4) prostate adenocarcinoma, grade group 2. He went on to have ultrasound-guided transperineal intraprostatic placement gold fiducial markers and SpaceOAR gel between the prostate and rectum followed by definitive radiation therapy to the prostate and proximal seminal vesicles per below:    Site Start TX Last [place] ED Fractions Dose Fx Dose Technique   Prostate, prox SV 22 41  7000 cGy 250 cGy VMAT                 Response/Tolerance: He tolerated therapy well with only mild fatigue, loose stools, and increase in urinary urgency frequency. He did not require any treatment breaks. Follow-up: I will see him again for a quick check in 1 month followed by a formal follow-up in 3 months with a repeat PSA.         Stephane Rowley MD PhD  Radiation Oncologist, [address] [place] [address]

## 2022-07-26 ENCOUNTER — APPOINTMENT (OUTPATIENT)
Dept: RADIATION ONCOLOGY | Age: 77
End: 2022-07-26
Payer: MEDICARE

## 2022-07-27 ENCOUNTER — APPOINTMENT (OUTPATIENT)
Dept: RADIATION ONCOLOGY | Age: 77
End: 2022-07-27
Payer: MEDICARE

## 2022-08-31 ENCOUNTER — HOSPITAL ENCOUNTER (OUTPATIENT)
Dept: RADIATION ONCOLOGY | Age: 77
Discharge: HOME | End: 2022-08-31
Payer: MEDICARE

## 2022-08-31 VITALS
HEART RATE: 62 BPM | WEIGHT: 143.8 LBS | SYSTOLIC BLOOD PRESSURE: 117 MMHG | RESPIRATION RATE: 16 BRPM | BODY MASS INDEX: 21.86 KG/M2 | OXYGEN SATURATION: 99 % | DIASTOLIC BLOOD PRESSURE: 68 MMHG | TEMPERATURE: 97.6 F

## 2022-08-31 DIAGNOSIS — C61 PROSTATE CANCER (HCC): Primary | ICD-10-CM

## 2022-08-31 PROCEDURE — 99212 OFFICE O/P EST SF 10 MIN: CPT | Performed by: RADIOLOGY

## 2022-08-31 NOTE — PROGRESS NOTES
NURSING ASSESSMENT     Date: 8/31/2022        Patient Name: Lurdes Jenkins     YOB: 1945      Age:  65 y.o. MRN: [id number]       Chaperone [x] Yes   [] No  Wife - Marquez Esteves    Advance Directives:   Do you currently have completed advance directives (living will)? [x] Yes   [] No         *If yes, please bring us a copy for your records. *If no, would you like info or assistance in completing advance directives (living will)? [] Yes   [x] No    Pain Score:   Pain Score (1-10): None at present, but has pressure on occasion before urination. Had a couple times this week. Barely noticeable burning sensation when inititiating urine stream.  Pain Location: Bladder area  Pain Duration: Bladder pressure and burning sensation is relieved after urination. Pain Management/Control: Takes Tylenol Arthritis when has generalized joint discomfort. Is pain affecting your ability to take care of yourself or move throughout your home? [] Yes   [x] No    General: Fatigue after activity  Patient has gained weight [] Yes   [x] No  Patient has lost weight [x] Yes   [] No  How much weight in pounds and over what length of time: Down 2 lbs since last OTV  on 7/21/22    Eyes (Ophthalmic): Wears glasses, dry eyes- uses saline drops     Skin (Dermatological): No Problems     ENT: Hears an echo in right ear when speaking that started 2 days ago. Respiratory: No Problems     Cardiovascular: Occasional muscle pain to left chest that goes away quickly since CABG in 6/2021. Cardiologist is aware. Device   [] Yes   [x] No   Copy of Card Obtained [] Yes   [x] No    Gastrointestinal: Diarrhea 2 weeks ago after eating [employer] food.     Genito-Urinary: See IPSS   Nocturia X1              Patient states overall urinary symptoms have improved since finishing RT                 Breast: No Problems     Musculoskeletal: Joint Pain after physical activity    Neurological: Dizziness when standing up too fast from a bending position. Hematological and Lymphatic: No Problems     Endocrine: Diabetes Mellitus      A 10-point review of systems  has been conducted and pertinent positives have been   recorded. All other review of systems are negative    Was the patient admitted during the course of treatment OR within 30 days of treatment?  No        Additional Comments:

## 2022-08-31 NOTE — PROGRESS NOTES
[facility]           Radiation Oncology      [address] [facility]        [address]        [facility]: [phone]        F: [phone]       [email or web address]                   Dr. Janine Marcos MD PhD    FOLLOW-UP NOTE     Date of Service: 2022  Patient ID: Lajean Paget   : 1945  MRN: [id number]   Acct Number: [de-identified]       NAME:  Lajean Paget    :  1945 65 y.o. male     PCP: Kami Avila PROVIDER: [place]    DIAGNOSIS:  1. Prostate cancer ([facility]        STAGING: Cancer Staging  Prostate cancer ([facility]  Staging form: Prostate, AJCC 8th Edition  - Clinical: Stage IIB (cT1c, cN0, cM0, PSA: 2.3, Grade Group: 2) - Signed by Janine Marcos MD on 2022      PRIOR TREATMENT: 7000 cGy in 28 fractions to the prostate and SV    TIME SINCE TREATMENT:  1 month    RECENT HISTORY: Lajean Paget returns for follow up status post completion of definitive radiation therapy for the above diagnosis. He returns for quick check approximately 1 month after completing radiation therapy. He does note that his dysuria and urgency has improved since completing radiation. He does still have a slight burning with urination at the start of the stream but overall intermittent. He denies any constipation but did have 1 bout of diarrhea that was diet related after eating some [place] food. He still notes some fatigue and has had a 2 pound weight loss, attributing both of these to his diabetes medication. He otherwise denies any other complaints at this time. Past medical, surgical, social and family histories reviewed and updated as indicated.     ALLERGIES:  Pcn [penicillins]       MEDICATIONS:   Current Outpatient Medications:     loperamide (IMODIUM) 2 MG capsule, Take 2 mg by mouth 4 times daily as needed for Diarrhea, Disp: , Rfl:     Semaglutide (RYBELSUS) 7 MG TABS, Take 7 mg by mouth daily, Disp: , Rfl:     empagliflozin (JARDIANCE) 10 MG tablet, Take 10 mg by mouth daily, Disp: , Rfl:     Cholecalciferol (VITAMIN D3) 50 MCG (2000 UT) CAPS, Take 2,000 Units by mouth daily, Disp: , Rfl:     nitroGLYCERIN (NITROSTAT) 0.4 MG SL tablet, Place 0.4 mg under the tongue every 5 minutes as needed for Chest pain up to max of 3 total doses. If no relief after 1 dose, call 911., Disp: , Rfl:     metoprolol succinate (TOPROL XL) 50 MG extended release tablet, Take 50 mg by mouth daily, Disp: , Rfl:     losartan (COZAAR) 25 MG tablet, Take 25 mg by mouth daily (Patient not taking: No sig reported), Disp: , Rfl:     Icosapent Ethyl (VASCEPA) 1 g CAPS capsule, Take 2 capsules by mouth 2 times daily, Disp: , Rfl:     hydroCHLOROthiazide (HYDRODIURIL) 25 MG tablet, Take 25 mg by mouth daily, Disp: , Rfl:     finasteride (PROSCAR) 5 MG tablet, Take 5 mg by mouth daily, Disp: , Rfl:     atorvastatin (LIPITOR) 80 MG tablet, Take 80 mg by mouth daily, Disp: , Rfl:     BABY ASPIRIN PO, Take 81 mg by mouth daily, Disp: , Rfl:     alfuzosin (UROXATRAL) 10 MG extended release tablet, Take 10 mg by mouth daily , Disp: , Rfl:     metFORMIN (GLUCOPHAGE) 500 MG tablet, Take 1,000 mg by mouth daily , Disp: , Rfl:     Review of Systems   All other systems reviewed and are negative. PHYSICAL EXAMINATION:      There were no vitals filed for this visit. Wt Readings from Last 3 Encounters:   07/21/22 145 lb 12.8 oz (66.1 kg)   07/14/22 148 lb 12.8 oz (67.5 kg)   07/06/22 148 lb 3.2 oz (67.2 kg)       ECOG PERFORMANCE STATUS:  1    Physical Exam  Vitals and nursing note reviewed. Constitutional:       Appearance: Normal appearance. Comments: Accompanied by his wife. HENT:      Head: Normocephalic and atraumatic. Eyes:      General: No scleral icterus. Extraocular Movements: Extraocular movements intact. Conjunctiva/sclera: Conjunctivae normal.   Pulmonary:      Effort: Pulmonary effort is normal.   Abdominal:      General: There is no distension.    Musculoskeletal: General: Normal range of motion. Cervical back: Normal range of motion. Right lower leg: No edema. Left lower leg: No edema. Skin:     General: Skin is warm and dry. Neurological:      General: No focal deficit present. Mental Status: He is alert. Mental status is at baseline. Psychiatric:         Mood and Affect: Mood normal.         Behavior: Behavior normal.        ASSESSMENT/PLAN:  This is a 65year old male with history of diabetes mellitus, hypertension, hyperlipidemia, CKD 3, anemia, STEMI, emergent CABG x3 vessels, and more recent diagnosis of Justin 7 (3+4) prostate adenocarcinoma, grade group 2. He went on to receive definitive radiation therapy to the prostate and seminal vesicles to 7000 cGy in 28 fractions completed on 7/25/2022. He returns for quick check approximately 1 month after completing radiation therapy. Majority of his sequelae of radiation therapy have resolved however he still notes some fatigue and intermittent dysuria. I discussed that his improvement in acute sequelae of radiation therapy is reassuring but did ask him to let me know if his dysuria did not resolve in the next 2 to 4 weeks. He will be discussing his diabetes medication modification with his PCP at his next visit. I did encourage him to increase physical activity as tolerated and to continue with healthy diet in line with his coronary risk factors. I will see him again in 2 months with a repeat PSA. In the interim he knows remain available should he have any additional questions or concerns.     ORDERS: Repeat serum PSA    FOLLOW-UP: Return to clinic for a follow-up on 11/15/2022    Henri Mariscal MD PhD  Radiation Oncologist, [address] [place] [address]

## 2022-10-18 DIAGNOSIS — C61 PROSTATE CANCER (HCC): ICD-10-CM

## 2022-10-18 LAB — PROSTATE SPECIFIC ANTIGEN: 0.85 NG/ML (ref 0–4)

## 2022-11-16 ENCOUNTER — HOSPITAL ENCOUNTER (OUTPATIENT)
Dept: RADIATION ONCOLOGY | Age: 77
Discharge: HOME | End: 2022-11-16
Payer: MEDICARE

## 2022-11-16 VITALS
RESPIRATION RATE: 18 BRPM | SYSTOLIC BLOOD PRESSURE: 125 MMHG | TEMPERATURE: 97 F | DIASTOLIC BLOOD PRESSURE: 66 MMHG | BODY MASS INDEX: 21.86 KG/M2 | OXYGEN SATURATION: 98 % | WEIGHT: 143.8 LBS | HEART RATE: 68 BPM

## 2022-11-16 DIAGNOSIS — C61 PROSTATE CANCER (HCC): Primary | ICD-10-CM

## 2022-11-16 PROCEDURE — 99214 OFFICE O/P EST MOD 30 MIN: CPT | Performed by: RADIOLOGY

## 2022-11-16 PROCEDURE — 99212 OFFICE O/P EST SF 10 MIN: CPT | Performed by: RADIOLOGY

## 2022-11-16 NOTE — PROGRESS NOTES
17 [facility]           Radiation Oncology      [address] [facility]        [address]        [phone]        F: [phone]       [place]                   Dr. Naveen Valladares MD PhD    FOLLOW-UP NOTE     Date of Service: 2022  Patient ID: Taniya Echeverria   : 1945  MRN: [id number]   Acct Number: [de-identified]       NAME:  Taniya Echeverria    :  1945 65 y.o. male     PCP: Danilo Louis PROVIDER: [place]    DIAGNOSIS:  1. [facility]        STAGING: Cancer Staging  [facility]  Staging form: Prostate, AJCC 8th Edition  - Clinical: Stage IIB (cT1c, cN0, cM0, PSA: 2.3, Grade Group: 2) - Signed by Naveen Valladares MD on 2022      PRIOR TREATMENT: 7000 cGy in 28 fractions to the prostate and SV     TIME SINCE TREATMENT: Approximately 4 months. RECENT HISTORY: Taniya Echeverria returns for follow up status post completion of definitive radiation therapy for the above diagnosis. He returns for formal follow-up approximately 4 months after completing radiation therapy. He does note complete resolution of his urgency but has occasional dysuria 2-3 times per week. He also has occasional diarrhea a few times a month but feels that this is diet related. He has nocturia 1-2 times nightly. He still notes some fatigue and has had a 2 pound weight loss, attributing both of these to his diabetes medication. Unrelated to his prostate cancer, he did note interval development over the past few weeks of drooping of his right eye. He denies any other neurologic symptoms and has alerted his PCP he will make a referral to ophthalmologist for further evaluation. On 10/18/2022 he had a PSA drawn which was 0.85, down from his prior pretreatment level. Past medical, surgical, social and family histories reviewed and updated as indicated.     ALLERGIES:  Pcn [penicillins]       MEDICATIONS:   Current Outpatient Medications: loperamide (IMODIUM) 2 MG capsule, Take 2 mg by mouth 4 times daily as needed for Diarrhea (Patient not taking: No sig reported), Disp: , Rfl:     Semaglutide (RYBELSUS) 7 MG TABS, Take 7 mg by mouth daily, Disp: , Rfl:     empagliflozin (JARDIANCE) 10 MG tablet, Take 10 mg by mouth daily, Disp: , Rfl:     Cholecalciferol (VITAMIN D3) 50 MCG (2000 UT) CAPS, Take 2,000 Units by mouth daily, Disp: , Rfl:     nitroGLYCERIN (NITROSTAT) 0.4 MG SL tablet, Place 0.4 mg under the tongue every 5 minutes as needed for Chest pain up to max of 3 total doses. If no relief after 1 dose, call 911., Disp: , Rfl:     metoprolol succinate (TOPROL XL) 50 MG extended release tablet, Take 50 mg by mouth daily, Disp: , Rfl:     losartan (COZAAR) 25 MG tablet, Take 25 mg by mouth daily (Patient not taking: No sig reported), Disp: , Rfl:     Icosapent Ethyl (VASCEPA) 1 g CAPS capsule, Take 2 capsules by mouth 2 times daily, Disp: , Rfl:     hydroCHLOROthiazide (HYDRODIURIL) 25 MG tablet, Take 25 mg by mouth daily, Disp: , Rfl:     finasteride (PROSCAR) 5 MG tablet, Take 5 mg by mouth daily, Disp: , Rfl:     atorvastatin (LIPITOR) 80 MG tablet, Take 80 mg by mouth daily, Disp: , Rfl:     BABY ASPIRIN PO, Take 81 mg by mouth daily, Disp: , Rfl:     alfuzosin (UROXATRAL) 10 MG extended release tablet, Take 10 mg by mouth daily , Disp: , Rfl:     metFORMIN (GLUCOPHAGE) 500 MG tablet, Take 1,000 mg by mouth daily , Disp: , Rfl:     Review of Systems   All other systems reviewed and are negative. PHYSICAL EXAMINATION:      Vitals:    11/16/22 0810   BP: 125/66   Pulse: 68   Resp: 18   Temp: 97 °F (36.1 °C)   SpO2: 98%   Weight: 143 lb 12.8 oz (65.2 kg)       Wt Readings from Last 3 Encounters:   11/16/22 143 lb 12.8 oz (65.2 kg)   08/31/22 143 lb 12.8 oz (65.2 kg)   07/21/22 145 lb 12.8 oz (66.1 kg)       ECOG PERFORMANCE STATUS:  1     Physical Exam  Vitals and nursing note reviewed. Constitutional:       Appearance: Normal appearance. Comments: Accompanied by his wife. HENT:      Head: Normocephalic and atraumatic. Eyes:      General: No scleral icterus. Extraocular Movements: Extraocular movements intact. Conjunctiva/sclera: Conjunctivae normal.   Comments: Ptosis of the right eye. Pulmonary:      Effort: Pulmonary effort is normal.   Abdominal:      General: There is no distension. Musculoskeletal:         General: Normal range of motion. Cervical back: Normal range of motion. Right lower leg: No edema. Left lower leg: No edema. Skin:     General: Skin is warm and dry. Neurological:      General: No focal deficit present. Mental Status: He is alert. Mental status is at baseline. Psychiatric:         Mood and Affect: Mood normal.         Behavior: Behavior normal.     ASSESSMENT/PLAN:  This is a 65year old male with history of diabetes mellitus, hypertension, hyperlipidemia, CKD 3, anemia, STEMI, emergent CABG x3 vessels, and more recent diagnosis of Kam 7 (3+4) prostate adenocarcinoma, grade group 2. He went on to receive definitive radiation therapy to the prostate and seminal vesicles to 7000 cGy in 28 fractions completed on 7/25/2022. He returns for a formal follow-up approximately 4 months after completing radiation therapy. The majority of his sequelae of radiation therapy have resolved however he still notes some fatigue and intermittent dysuria. I reviewed with him that his PSA decline is reassuring and we will continue to monitor this closely. At this time he is clinically and biochemically free of disease recurrence. I will see him again in 3 months with a repeat PSA. In the interim I did recommend that he watch his diet to address his symptoms of occasional diarrhea but also for general health. I did recommend that the patient get his right eye ptosis evaluated by an eye doctor. He will be seeing 1 in a few weeks.     I discussed that his improvement in acute sequelae of radiation therapy is reassuring but did ask him to let me know if his dysuria did not resolve in the next 2 to 4 weeks. He will be discussing his diabetes medication modification with his PCP at his next visit. I did encourage him to increase physical activity as tolerated and to continue with healthy diet in line with his coronary risk factors. I will see him again in 2 months with a repeat PSA. In the interim he knows remain available should he have any additional questions or concerns. A combined total of 45 minutes was spent in preparing this encounter as well as in meeting with the patient in person. Please excuse any typos in this consultation note as voice dictation was used. ORDERS: Repeat serum PSA    FOLLOW-UP: Return for routine follow-up on 2/28/2023.     Berenice Berkowitz MD PhD  Radiation Oncologist, [address] [place] [address]

## 2022-11-16 NOTE — PROGRESS NOTES
NURSING ASSESSMENT     Date: 11/16/2022        Patient Name: Pretty Mortensen     YOB: 1945      Age:  65 y.o. MRN: [id number]       Chaperone [x] Yes   [] No  Wife-Vesna    Advance Directives:   Do you currently have completed advance directives (living will)? [x] Yes   [] No         *If yes, please bring us a copy for your records. *If no, would you like info or assistance in completing advance directives (living will)? [] Yes   [x] No    Pain Score:   Pain Score (1-10): 0-4   Pain Location: Ptt states bladder, low abdomen   Pain Duration: 2-3 times weekly will have discomfort when bladder is full, but goes away after after urination. Pain Management/Control: Takes Tylenol prn for left \"sciatica\" pain, has not had any episodes in months      Is pain affecting your ability to take care of yourself or move throughout your home? [] Yes   [x] No    General: No Problems  Patient has gained weight [] Yes   [x] No  Patient has lost weight [] Yes   [x] No  How much weight in pounds and over what length of time:     Eyes (Ophthalmic): Small lump to right eyelid that was bigger about 1 month ago. Patient sees eye Dr in February 2023, but will get a sooner appointment if doesn't go away. Skin (Dermatological): No Problems     ENT: Still continues to have an occasional echo in right ear. Using OTC ear drops to remove wax which is helpful. Respiratory: No Problems     Cardiovascular: Same as last visit. Occasional muscle pain to left chest that goes away quickly since CABG in 6/2021. Cardidiologist is aware. Device   [] Yes   [x] No   Copy of Card Obtained [] Yes   [x] No    Gastrointestinal: Has diarrhea after eating [place] food or chili. Last episode of diarrhea was last Sunday after eating stew. Otherwise has formed BM's every other day.     Genito-Urinary:    See IPPS sheet   Frequency- depends on fluid intake   Nocturia X 1-2 depending on water intake and what time he goes to bed. Urgency without incontinence       Breast: No Problems     Musculoskeletal: Joint pain especially left hip with physical activity    Neurological: Still gets dizzy when standing up too fast from a bending position      Hematological and Lymphatic: Easy bruising-takes baby ASA     Endocrine: Diabetes Mellitus        A 10-point review of systems  has been conducted and pertinent positives have been   recorded. All other review of systems are negative    Was the patient admitted during the course of treatment OR within 30 days of treatment?  NO      Additional Comments: Appointment will Dasia Becerra CNP with [facility] urology on 3/7/23

## 2023-01-24 NOTE — PROGRESS NOTES
Discharge instructions reviewed with patient. Follow up visit has been scheduled for Wednesday, 8/31/22 at 8:00 am. Encouraged patient to call with any questions or concerns. Problem list     Subjective   Keo Hernandez is a 63 y.o. male     Chief Complaint   Patient presents with   • Follow-up     6 MTH F/U    Problem list  1.  Chest pain  1.1 stress test November 2018 demonstrates anterior and anteroseptal ischemia with elevated TID concerning for 3 vessel disease  1.2 cardiac catheterization December 2018 demonstrated a chronic total occlusion of the RCA at the acute margin.  Collaterals noted with nonobstructive LAD and circumflex with medical management recommended  2.  Persistent atrial fibrillation/flutter on amiodarone   2.1 chads score of 2 on Eliquis  2.2 status post electrocardioversion June 2021  3.  Dyslipidemia  4.  Chronic lung disease  5.6.  Dilated cardiomyopathy and chronic systolic heart failure  6.1 EF estimated at 40% by echocardiogram January 2021  HPI    Patient is a 63-year-old that presents to the office to be evaluated.    Patient is doing well.  He has actually tried to lose weight and has done it successfully.  He does not describe any chest pain or pressure but is short of breath.  He is followed by pulmonology and has underlying lung disease.  He continues to smoke.  He does not describe PND orthopnea.    He does not describe palpitations or dysrhythmic symptoms.  He has done well    He has history of ischemic cardiomyopathy with an EF of 40% by echo 2 years ago.  He has history of atrial fibrillation currently on rhythm control with amiodarone and Xarelto for anticoagulation.  Otherwise, he is stable.      Current Outpatient Medications on File Prior to Visit   Medication Sig Dispense Refill   • albuterol sulfate  (90 Base) MCG/ACT inhaler Inhale 2 puffs Every 4 (Four) Hours As Needed for Wheezing.     • amiodarone (PACERONE) 200 MG tablet TAKE 1 TABLET BY MOUTH DAILY. 30 tablet 5   • atorvastatin (LIPITOR) 40 MG tablet Take 1 tablet by mouth Daily. 30 tablet 11   • Budeson-Glycopyrrol-Formoterol (BREZTRI) 160-9-4.8 MCG/ACT aerosol inhaler Inhale 2  puffs 2 (Two) Times a Day.     • chlorthalidone (HYGROTON) 25 MG tablet Take 25 mg by mouth Daily.     • fluticasone (FLONASE) 50 MCG/ACT nasal spray As Needed.  5   • furosemide (LASIX) 40 MG tablet Take 1 tablet by mouth 2 (Two) Times a Day As Needed (EDEMA). 60 tablet 5   • levocetirizine (XYZAL) 5 MG tablet Take 5 mg by mouth Every Evening.     • lisinopril (PRINIVIL,ZESTRIL) 10 MG tablet TAKE 1 TABLET BY MOUTH 2 TIMES A DAY 60 tablet 1   • loratadine (CLARITIN) 10 MG tablet Take 10 mg by mouth Daily.  5   • metFORMIN (GLUCOPHAGE) 500 MG tablet Take 500 mg by mouth Daily With Breakfast.     • metoprolol succinate XL (TOPROL-XL) 50 MG 24 hr tablet TAKE ONE TABLET BY MOUTH EVERY DAY 30 tablet 5   • montelukast (SINGULAIR) 10 MG tablet Take 10 mg by mouth Every Night.     • nitroglycerin (NITROSTAT) 0.4 MG SL tablet 1 under the tongue as needed for angina, may repeat q5mins for up three doses 100 tablet 11   • O2 (OXYGEN) Inhale 2 L/min Every Night.     • potassium chloride 10 MEQ CR tablet TAKE 1 TABLET BY MOUTH 2 (TWO) TIMES A DAY. 60 tablet 11   • ranolazine (RANEXA) 1000 MG 12 hr tablet TAKE 1 TABLET EVERY 12 HOURS 60 tablet 5   • rivaroxaban (Xarelto) 20 MG tablet Take 1 tablet by mouth Daily. 90 tablet 3     No current facility-administered medications on file prior to visit.       Patient has no known allergies.    Past Medical History:   Diagnosis Date   • A-fib ([facility])    • Asthma    • COPD (chronic obstructive pulmonary disease) ([facility])    • Diabetes mellitus ([facility])    • Hyperlipidemia    • Hypertension    • Myocardial infarction ([facility])     1996   • Sleep apnea        Social History     Socioeconomic History   • Marital status:    Tobacco Use   • Smoking status: Every Day     Packs/day: 1.00     Years: 30.00     Pack years: 30.00     Types: Cigarettes   • Smokeless tobacco: Never   Substance and Sexual Activity   • Alcohol use: No   • Drug use: No       Family History   Problem Relation Age of Onset   •  "Heart attack Mother    • Hypertension Mother    • Heart attack Father        Review of Systems   Constitutional: Negative for chills and fever.   HENT: Negative for congestion, dental problem, drooling, ear discharge and ear pain.    Eyes: Negative for pain, redness, itching and visual disturbance.   Respiratory: Positive for cough, shortness of breath and wheezing. Negative for choking and chest tightness.    Cardiovascular: Positive for leg swelling (sometimes). Negative for chest pain and palpitations (seldom).   Gastrointestinal: Negative.  Negative for blood in stool.   Genitourinary: Negative.    Musculoskeletal: Negative.    Skin: Negative for rash and wound.   Neurological: Positive for dizziness, weakness (weakness in legs) and headaches (frequent). Negative for numbness.   Psychiatric/Behavioral: Negative for sleep disturbance.       Objective   Vitals:    01/24/23 0942   BP: 157/93   Pulse: 85   SpO2: 97%   Weight: 103 kg (226 lb 9.6 oz)   Height: 180.3 cm (70.98\")      /93   Pulse 85   Ht 180.3 cm (70.98\")   Wt 103 kg (226 lb 9.6 oz)   SpO2 97%   BMI 31.62 kg/m²     Lab Results (most recent)     None          Physical Exam  Vitals and nursing note reviewed.   Constitutional:       General: He is not in acute distress.     Appearance: Normal appearance. He is well-developed.   HENT:      Head: Normocephalic and atraumatic.   Eyes:      General: No scleral icterus.        Right eye: No discharge.         Left eye: No discharge.      Conjunctiva/sclera: Conjunctivae normal.   Neck:      Vascular: No carotid bruit.   Cardiovascular:      Rate and Rhythm: Normal rate and regular rhythm.      Heart sounds: Normal heart sounds. No murmur heard.    No friction rub. No gallop.   Pulmonary:      Effort: Pulmonary effort is normal. No respiratory distress.      Breath sounds: Normal breath sounds. No wheezing or rales.   Chest:      Chest wall: No tenderness.   Musculoskeletal:      Right lower leg: No " edema.      Left lower leg: No edema.   Skin:     General: Skin is warm and dry.      Coloration: Skin is not pale.      Findings: No erythema or rash.   Neurological:      Mental Status: He is alert and oriented to person, place, and time.      Cranial Nerves: No cranial nerve deficit.   Psychiatric:         Behavior: Behavior normal.         Procedure   Procedures       Assessment & Plan     Problems Addressed this Visit        Cardiac and Vasculature    Atrial fibrillation (HCC)    Coronary artery disease involving native coronary artery of native heart without angina pectoris    Relevant Orders    Adult Transthoracic Echo Complete W/ Cont if Necessary Per Protocol    Ischemic cardiomyopathy - Primary    Relevant Orders    Adult Transthoracic Echo Complete W/ Cont if Necessary Per Protocol       Pulmonary and Pneumonias    Shortness of breath    Relevant Orders    Adult Transthoracic Echo Complete W/ Cont if Necessary Per Protocol   Diagnoses       Codes Comments    Ischemic cardiomyopathy    -  Primary ICD-10-CM: I25.5  ICD-9-CM: 414.8     Shortness of breath     ICD-10-CM: R06.02  ICD-9-CM: 786.05     Coronary artery disease involving native coronary artery of native heart without angina pectoris     ICD-10-CM: I25.10  ICD-9-CM: 414.01     Paroxysmal atrial fibrillation (HCC)     ICD-10-CM: I48.0  ICD-9-CM: 427.31           Recommendation  1.  Patient is a 63-year-old male who presents to the office for evaluation with coronary disease doing well.  He has no angina.  For now, we will continue risk factor modification to include antiplatelet and statin therapy.  His labs have been managed by primary.    2.  With history of chronic systolic heart failure and diabetes, I am adding an SGLT2 inhibitor such as Jardiance.    3.  I would like to reassess LV systolic performance as it was approximately 40% by echo 2 years ago.  We will evaluate response to medications.  I believe Entresto was tried in the past but  patient cannot afford that medication.  He is doing well on beta-blocker and ACE inhibitor.    4.  For now, we will continue the same otherwise.  We will see him back for follow-up in 6 months.  Ultimately, in regards to atrial fibrillation, would like to get him off of amiodarone.  We are limited because of his coronary disease and heart failure.  He sees pulmonology.  We can continue to monitor for long-term effects of amiodarone.  For now, we will see him back for follow-up as discussed.  Follow-up with primary as scheduled.           Keo Zuñigaxell  reports that he has been smoking cigarettes. He has a 30.00 pack-year smoking history. He has never used smokeless tobacco..          Electronically signed by:

## 2023-02-13 LAB
ANION GAP SERPL CALCULATED.3IONS-SCNC: 9 MEQ/L (ref 9–15)
BUN BLDV-MCNC: 15 MG/DL (ref 8–23)
CALCIUM SERPL-MCNC: 9.6 MG/DL (ref 8.5–9.9)
CHLORIDE BLD-SCNC: 100 MEQ/L (ref 95–107)
CHOLESTEROL, TOTAL: 115 MG/DL (ref 0–199)
CO2: 31 MEQ/L (ref 20–31)
CREAT SERPL-MCNC: 0.83 MG/DL (ref 0.7–1.2)
GFR SERPL CREATININE-BSD FRML MDRD: >60 ML/MIN/{1.73_M2}
GLUCOSE BLD-MCNC: 109 MG/DL (ref 70–99)
HDLC SERPL-MCNC: 58 MG/DL (ref 40–59)
LDL CHOLESTEROL CALCULATED: 43 MG/DL (ref 0–129)
MAGNESIUM: 2 MG/DL (ref 1.7–2.4)
POTASSIUM SERPL-SCNC: 4.1 MEQ/L (ref 3.4–4.9)
SODIUM BLD-SCNC: 140 MEQ/L (ref 135–144)
TOTAL CK: 80 U/L (ref 0–190)
TRIGL SERPL-MCNC: 69 MG/DL (ref 0–150)

## 2023-02-28 ENCOUNTER — HOSPITAL ENCOUNTER (OUTPATIENT)
Dept: RADIATION ONCOLOGY | Age: 78
Discharge: HOME | End: 2023-02-28
Payer: COMMERCIAL

## 2023-02-28 VITALS
WEIGHT: 144.8 LBS | TEMPERATURE: 97.5 F | SYSTOLIC BLOOD PRESSURE: 119 MMHG | DIASTOLIC BLOOD PRESSURE: 62 MMHG | HEART RATE: 70 BPM | BODY MASS INDEX: 22.02 KG/M2 | OXYGEN SATURATION: 99 % | RESPIRATION RATE: 16 BRPM

## 2023-02-28 DIAGNOSIS — C61 PROSTATE CANCER (HCC): Primary | ICD-10-CM

## 2023-02-28 PROCEDURE — 99213 OFFICE O/P EST LOW 20 MIN: CPT | Performed by: RADIOLOGY

## 2023-02-28 PROCEDURE — 99212 OFFICE O/P EST SF 10 MIN: CPT | Performed by: RADIOLOGY

## 2023-02-28 NOTE — PROGRESS NOTES
NURSING ASSESSMENT     Date: 2/28/2023        Patient Name: Noemy Florez     YOB: 1945      Age:  65 y.o. MRN: [id number]       Chaperone [x] Yes   [] No  wife present at [facility]:   Do you currently have completed advance directives (living will)? [x] Yes   [] No         *If yes, please bring us a copy for your records. *If no, would you like info or assistance in completing advance directives (living will)? [] Yes   [x] No    Pain Score:   Pain Score (1-10): none      Is pain affecting your ability to take care of yourself or move throughout your home? [] Yes   [x] No    General: No Problems  Patient has gained weight [] Yes   [x] No  Patient has lost weight [] Yes   [x] No  How much weight in pounds and over what length of time:     Eyes (Ophthalmic): dry eyes     Skin (Dermatological): No Problems     ENT: No Problems     Respiratory: No Problems     Cardiovascular: No Problems      Device   [] Yes   [x] No   Copy of Card Obtained [] Yes   [x] No    Gastrointestinal: No Problems    Genito-Urinary: IPSS 3     Breast: No Problems     Musculoskeletal: has left sciatic pain on occasion, arthritis right foot    Neurological: Tingling on occasion right foot if crosses legs      Hematological and Lymphatic: No Problems     Endocrine: Diabetes Mellitus, HgbA1c 5.6        A 10-point review of systems  has been conducted and pertinent positives have been   recorded. All other review of systems are negative    Was the patient admitted during the course of treatment OR within 30 days of treatment?  no    Additional Comments: will see urology at [facility] - [facility] 3/7/23

## 2023-03-01 NOTE — PROGRESS NOTES
[facility]           Radiation Oncology      [address] [facility]        Belen Sarabia Ree: [phone]        F: [phone]       [email or web address]                   Dr. Ezzard Habermann MD PhD    FOLLOW-UP NOTE     Date of Service: 2023  Patient ID: Luther Marrero   : 1945  MRN: [id number]   Acct Number: [de-identified]       NAME:  Luther Marrero    :  1945 65 y.o. male     PCP: Allie Melendez PROVIDER: [place]    DIAGNOSIS:  1. Prostate cancer ([facility] [address]        STAGING: Cancer Staging  [facility]  Staging form: Prostate, AJCC 8th Edition  - Clinical: Stage IIB (cT1c, cN0, cM0, PSA: 2.3, Grade Group: 2) - Signed by Ezzard Habermann, MD on 2022         PRIOR TREATMENT: 7000 cGy in 28 fractions to the prostate and SV     TIME SINCE TREATMENT: 7 months     RECENT HISTORY: Luther Marrero returns for follow up status post completion of definitive radiation therapy for the above diagnosis. He returns for formal follow-up approximately 7 months after completing radiation therapy. He notes that his IPSS score today is 3 with biggest complaint being frequency, urgency, and nocturia 0-1 times nightly. His energy has significantly improved and is nearly back to baseline. His dysuria is also resolved. He is still taking alfuzosin and Proscar. He is being followed by urology for this. His most recent PSA is 0.85 which represents a nice steady decline from his pretreatment PSA. He otherwise denies any complaints at this time. Past medical, surgical, social and family histories reviewed and updated as indicated.        ALLERGIES:  Pcn [penicillins]       MEDICATIONS:   Current Outpatient Medications:     loperamide (IMODIUM) 2 MG capsule, Take 2 mg by mouth 4 times daily as needed for Diarrhea (Patient not taking: No sig reported), Disp: , Rfl:     Semaglutide (RYBELSUS) 7 MG TABS, Take 7 mg by mouth daily, Disp: , Rfl:     empagliflozin (JARDIANCE) 10 MG tablet, Take 10 mg by mouth daily, Disp: , Rfl:     Cholecalciferol (VITAMIN D3) 50 MCG (2000 UT) CAPS, Take 2,000 Units by mouth daily, Disp: , Rfl:     nitroGLYCERIN (NITROSTAT) 0.4 MG SL tablet, Place 0.4 mg under the tongue every 5 minutes as needed for Chest pain up to max of 3 total doses. If no relief after 1 dose, call 911., Disp: , Rfl:     metoprolol succinate (TOPROL XL) 50 MG extended release tablet, Take 50 mg by mouth daily, Disp: , Rfl:     Icosapent Ethyl (VASCEPA) 1 g CAPS capsule, Take 2 capsules by mouth 2 times daily, Disp: , Rfl:     hydroCHLOROthiazide (HYDRODIURIL) 25 MG tablet, Take 25 mg by mouth daily, Disp: , Rfl:     finasteride (PROSCAR) 5 MG tablet, Take 5 mg by mouth daily, Disp: , Rfl:     atorvastatin (LIPITOR) 80 MG tablet, Take 80 mg by mouth daily, Disp: , Rfl:     BABY ASPIRIN PO, Take 81 mg by mouth daily, Disp: , Rfl:     alfuzosin (UROXATRAL) 10 MG extended release tablet, Take 10 mg by mouth daily , Disp: , Rfl:     metFORMIN (GLUCOPHAGE) 500 MG tablet, Take 1,000 mg by mouth daily , Disp: , Rfl:     Review of Systems   All other systems reviewed and are negative. PHYSICAL EXAMINATION:      Vitals:    02/28/23 0833   BP: 119/62   Pulse: 70   Resp: 16   Temp: 97.5 °F (36.4 °C)   SpO2: 99%   Weight: 144 lb 12.8 oz (65.7 kg)       Wt Readings from Last 3 Encounters:   02/28/23 144 lb 12.8 oz (65.7 kg)   11/16/22 143 lb 12.8 oz (65.2 kg)   08/31/22 143 lb 12.8 oz (65.2 kg)       ECOG PERFORMANCE STATUS:  1     Physical Exam  Vitals and nursing note reviewed. Constitutional:       Appearance: Normal appearance. Comments: Accompanied by his wife. HENT:      Head: Normocephalic and atraumatic. Eyes:      General: No scleral icterus. Extraocular Movements: Extraocular movements intact. Conjunctiva/sclera: Conjunctivae normal.   Comments: Ptosis of the right eye.    Pulmonary:      Effort: Pulmonary effort is normal.   Abdominal:      General: There is no distension. Musculoskeletal:         General: Normal range of motion. Cervical back: Normal range of motion. Right lower leg: No edema. Left lower leg: No edema. Skin:     General: Skin is warm and dry. Neurological:      General: No focal deficit present. Mental Status: He is alert. Mental status is at baseline. Psychiatric:         Mood and Affect: Mood normal.         Behavior: Behavior normal.     ASSESSMENT/PLAN:  This is a 65year old male with history of diabetes mellitus, hypertension, hyperlipidemia, CKD 3, anemia, STEMI, emergent CABG x3 vessels, and more recent diagnosis of Kam 7 (3+4) prostate adenocarcinoma, grade group 2. He went on to receive definitive radiation therapy to the prostate and seminal vesicles to 7000 cGy in 28 fractions completed on 7/25/2022. He returns for a formal follow-up approximately 7 months after completing radiation therapy with all sequelae of radiation having resolved. His urinary symptoms are back to baseline as is his energy and appetite. I reviewed with him that he is doing well clinically and his PSA decreased demonstrates continued response clinically which the patient was reassured about. I will see him again in 6 months time with a repeat PSA. In the interim he knows that we remain available should he have additional questions or concerns. A combined total of 30 minutes was spent in preparing this encounter as well as in meeting with the patient in person. Please excuse any typos in this consultation note as voice dictation was used.     ORDERS: Repeat serum PSA    FOLLOW-UP: Return for follow-up on 8/29/2023    Eugenio Medina MD PhD  Radiation Oncologist, [address] [place] [address]

## 2023-08-21 DIAGNOSIS — C61 PROSTATE CANCER (HCC): ICD-10-CM

## 2023-08-21 LAB — PSA SERPL-MCNC: 0.31 NG/ML (ref 0–4)

## 2023-08-29 ENCOUNTER — HOSPITAL ENCOUNTER (OUTPATIENT)
Dept: RADIATION ONCOLOGY | Age: 78
Discharge: HOME | End: 2023-08-29
Payer: COMMERCIAL

## 2023-08-29 VITALS
WEIGHT: 150 LBS | BODY MASS INDEX: 22.81 KG/M2 | HEART RATE: 54 BPM | OXYGEN SATURATION: 96 % | TEMPERATURE: 97.5 F | RESPIRATION RATE: 18 BRPM | DIASTOLIC BLOOD PRESSURE: 71 MMHG | SYSTOLIC BLOOD PRESSURE: 138 MMHG

## 2023-08-29 DIAGNOSIS — C61 PROSTATE CANCER (HCC): Primary | ICD-10-CM

## 2023-08-29 PROCEDURE — 99213 OFFICE O/P EST LOW 20 MIN: CPT | Performed by: RADIOLOGY

## 2023-08-29 PROCEDURE — 99212 OFFICE O/P EST SF 10 MIN: CPT | Performed by: RADIOLOGY

## 2023-08-29 NOTE — PROGRESS NOTES
NURSING ASSESSMENT     Date: 8/29/2023        Patient Name: Lucretia Primrose     YOB: 1945      Age:  77 y.o. MRN: [id number]       Chaperone [x] Yes   [] No  wife-Vesna    Advance Directives:   Do you currently have completed advance directives (living will)? [x] Yes   [] No         *If yes, please bring us a copy for your records. *If no, would you like info or assistance in completing advance directives (living will)? [] Yes   [x] No    Pain Score:   Pain Score (1-10): Denies   Pain Location: NA   Pain Duration: NA   Pain Management/Control: NA      Is pain affecting your ability to take care of yourself or move throughout your home? [] Yes   [x] No    General: No Problems  Patient has gained weight [x] Yes   [] No  Patient has lost weight [] Yes   [x] No  How much weight in pounds and over what length of time: Up 5.2 lbs since last visit on 2/28/23    Eyes (Ophthalmic): Wears glasses, dry eyes, uses eye drops     Skin (Dermatological): No Problems     ENT: No Problems     Respiratory: No Problems     Cardiovascular: No Problems      Device   [] Yes   [x] No   Copy of Card Obtained [] Yes   [x] No    Gastrointestinal: Diarrhea on occasion depending on food that doesn't agree with him. Uses imodium as needed. Last imodium use was 2 months ago    Genito-Urinary: IPSS score is 2          Breast: No Problems     Musculoskeletal: Less frequency of sciatic discomfort. Uses cane to walk around the block    Neurological: H/O dizziness when bending over to a standing position. Has never had a syncopal episode      Hematological and Lymphatic: Easy Bruising     Endocrine: Diabetes Mellitus        A 10-point review of systems  has been conducted and pertinent positives have been   recorded. All other review of systems are negative    Was the patient admitted during the course of treatment OR within 30 days of treatment?  No        Additional Comments: 9/23/23 will see Yuvonne Sever West Hair CNP with urology at [facility].

## 2023-12-14 PROBLEM — H69.90 EUSTACHIAN TUBE DYSFUNCTION: Status: ACTIVE | Noted: 2023-12-14

## 2023-12-14 PROBLEM — I49.8 ATRIAL ARRHYTHMIA: Status: ACTIVE | Noted: 2023-12-14

## 2023-12-14 PROBLEM — R35.0 URINARY FREQUENCY: Status: ACTIVE | Noted: 2023-12-14

## 2023-12-14 PROBLEM — H61.23 BILATERAL IMPACTED CERUMEN: Status: ACTIVE | Noted: 2023-12-14

## 2023-12-14 PROBLEM — C61 PROSTATE CANCER (MULTI): Status: ACTIVE | Noted: 2023-12-14

## 2023-12-14 PROBLEM — E78.5 HYPERLIPEMIA: Status: ACTIVE | Noted: 2023-12-14

## 2023-12-14 PROBLEM — N32.3 DIVERTICULUM OF BLADDER: Status: ACTIVE | Noted: 2023-12-14

## 2023-12-14 PROBLEM — R42 DIZZINESS: Status: ACTIVE | Noted: 2023-12-14

## 2023-12-14 PROBLEM — Z95.1 S/P CABG (CORONARY ARTERY BYPASS GRAFT): Status: ACTIVE | Noted: 2023-12-14

## 2023-12-14 PROBLEM — R07.89 CHEST PAIN, ATYPICAL: Status: ACTIVE | Noted: 2023-12-14

## 2023-12-14 PROBLEM — I20.9 ANGINA PECTORIS (CMS-HCC): Status: ACTIVE | Noted: 2023-12-14

## 2023-12-14 PROBLEM — N39.0 UTI (URINARY TRACT INFECTION): Status: ACTIVE | Noted: 2023-12-14

## 2023-12-14 PROBLEM — H90.3 ASYMMETRIC SNHL (SENSORINEURAL HEARING LOSS): Status: ACTIVE | Noted: 2023-12-14

## 2023-12-14 PROBLEM — R31.9 HEMATURIA: Status: ACTIVE | Noted: 2023-12-14

## 2023-12-14 PROBLEM — R33.9 RETENTION OF URINE: Status: ACTIVE | Noted: 2023-12-14

## 2023-12-14 PROBLEM — E11.9 TYPE 2 DIABETES MELLITUS (MULTI): Status: ACTIVE | Noted: 2023-12-14

## 2023-12-14 PROBLEM — H60.60 CHRONIC OTITIS EXTERNA: Status: ACTIVE | Noted: 2023-12-14

## 2023-12-14 RX ORDER — EMPAGLIFLOZIN 10 MG/1
10 TABLET, FILM COATED ORAL DAILY
COMMUNITY

## 2023-12-14 RX ORDER — ACETAMINOPHEN 500 MG
2000 TABLET ORAL DAILY
COMMUNITY

## 2023-12-14 RX ORDER — ALFUZOSIN HYDROCHLORIDE 10 MG/1
10 TABLET, EXTENDED RELEASE ORAL DAILY
COMMUNITY

## 2023-12-14 RX ORDER — NAPROXEN SODIUM 220 MG/1
81 TABLET, FILM COATED ORAL DAILY
COMMUNITY
Start: 2022-06-30

## 2023-12-14 RX ORDER — ASPIRIN 81 MG
100 TABLET, DELAYED RELEASE (ENTERIC COATED) ORAL 2 TIMES DAILY PRN
COMMUNITY

## 2023-12-14 RX ORDER — ATORVASTATIN CALCIUM 80 MG/1
80 TABLET, FILM COATED ORAL NIGHTLY
COMMUNITY
Start: 2015-09-15

## 2023-12-14 RX ORDER — ICOSAPENT ETHYL 1000 MG/1
2 CAPSULE ORAL
COMMUNITY
Start: 2022-06-17

## 2023-12-14 RX ORDER — ACETAMINOPHEN 500 MG/1
1000 CAPSULE, LIQUID FILLED ORAL EVERY 6 HOURS PRN
COMMUNITY

## 2023-12-14 RX ORDER — NITROGLYCERIN 0.4 MG/1
0.4 TABLET SUBLINGUAL EVERY 5 MIN PRN
COMMUNITY
Start: 2014-11-11

## 2023-12-14 RX ORDER — FINASTERIDE 5 MG/1
5 TABLET, FILM COATED ORAL DAILY
COMMUNITY

## 2023-12-14 RX ORDER — METOPROLOL SUCCINATE 50 MG/1
50 TABLET, EXTENDED RELEASE ORAL NIGHTLY
COMMUNITY
Start: 2022-06-09

## 2023-12-14 RX ORDER — ORAL SEMAGLUTIDE 7 MG/1
7 TABLET ORAL DAILY
COMMUNITY
Start: 2023-11-30

## 2023-12-18 ENCOUNTER — TELEPHONE (OUTPATIENT)
Dept: CARDIOLOGY | Facility: CLINIC | Age: 78
End: 2023-12-18

## 2024-01-04 DIAGNOSIS — I49.8 ATRIAL ARRHYTHMIA: ICD-10-CM

## 2024-01-04 RX ORDER — ALFUZOSIN HYDROCHLORIDE 10 MG/1
10 TABLET, EXTENDED RELEASE ORAL DAILY
OUTPATIENT
Start: 2024-01-04

## 2024-01-04 RX ORDER — METOPROLOL SUCCINATE 50 MG/1
50 TABLET, EXTENDED RELEASE ORAL NIGHTLY
OUTPATIENT
Start: 2024-01-04

## 2024-07-24 DIAGNOSIS — C61 PROSTATE CANCER (HCC): ICD-10-CM

## 2024-07-24 LAB — PSA SERPL-MCNC: 0.48 NG/ML (ref 0–4)

## 2024-08-13 ENCOUNTER — HOSPITAL ENCOUNTER (OUTPATIENT)
Dept: RADIATION ONCOLOGY | Age: 79
Discharge: HOME | End: 2024-08-13
Payer: COMMERCIAL

## 2024-08-13 VITALS
DIASTOLIC BLOOD PRESSURE: 78 MMHG | WEIGHT: 155.6 LBS | RESPIRATION RATE: 16 BRPM | HEART RATE: 60 BPM | BODY MASS INDEX: 23.66 KG/M2 | TEMPERATURE: 97.8 F | OXYGEN SATURATION: 97 % | SYSTOLIC BLOOD PRESSURE: 121 MMHG

## 2024-08-13 DIAGNOSIS — C61 PROSTATE CANCER (HCC): Primary | ICD-10-CM

## 2024-08-13 PROCEDURE — 99212 OFFICE O/P EST SF 10 MIN: CPT | Performed by: RADIOLOGY

## 2024-08-13 PROCEDURE — G2211 COMPLEX E/M VISIT ADD ON: HCPCS | Performed by: RADIOLOGY

## 2024-08-13 PROCEDURE — 99214 OFFICE O/P EST MOD 30 MIN: CPT | Performed by: RADIOLOGY

## 2024-08-13 NOTE — PROGRESS NOTES
NURSING ASSESSMENT     Date: 8/13/2024        Patient Name: Placido Bhatia     YOB: 1945      Age:  79 y.o.      MRN: [id number]       Chaperone [] Yes   [x] No      Advance Directives:   Do you currently have completed advance directives (living will)? [x] Yes   [] No         *If yes, please bring us a copy for your records.  *If no, would you like info or assistance in completing advance directives (living will)?   [] Yes   [x] No    Pain Score:   Pain Score (1-10): none      Is pain affecting your ability to take care of yourself or move throughout your home?                        [] Yes   [x] No    General: No Problems  Patient has gained weight [] Yes   [x] No  Patient has lost weight [] Yes   [x] No  How much weight in pounds and over what length of time:     Eyes (Ophthalmic): wears corrective lenses     Skin (Dermatological): No Problems     ENT: has ringing in ears     Respiratory: No Problems     Cardiovascular: No Problems      Device   [] Yes   [x] No   Copy of Card Obtained [] Yes   [x] No    Gastrointestinal: has had some stool urgency with incontinence about a month ago for a couple week but seems to have resolved with no incidence for 2-3 weeks now, no pain, blood or mucous in stool    Genito-Urinary: IPSS 5, incomplete emptying, intermittency, frequency and nocturia x1, occasional burning with urination if he postpones urination over the past several months     Breast: No Problems     Musculoskeletal: Back Pain when lifts too much and has trouble with sciatica on occasion    Neurological: No Problems      Hematological and Lymphatic: No Problems     Endocrine: Diabetes Mellitus controlled    A 10-point review of systems  has been conducted and pertinent positives have been   recorded. All other review of systems are negative    Was the patient admitted during the course of treatment OR within 30 days of treatment? N/a    Additional Comments: will see [facility] in September

## 2024-10-28 NOTE — PROGRESS NOTES
[facility]           Radiation Oncology      [id number] [place], [place] [id number]        O: [phone]        F: [phone]       [facility]                   Dr. Alvino Newell MD PhD    FOLLOW-UP NOTE     Date of Service: 2024  Patient ID: Placido Bhatia   : 1945  MRN: [id number]   Acct Number: [id number]       NAME:  Placido Bhatia    :  1945 79 y.o. male     PCP: Mani Pope MD    REFERRING PROVIDER: [place]    DIAGNOSIS:  1. Prostate cancer (H[facility])        STAGING: Cancer Staging   Prostate cancer (HCC)  Staging form: Prostate, AJCC 8th Edition  - Clinical: Stage IIB (cT1c, cN0, cM0, PSA: 2.3, Grade Group: 2) - Signed by Alvino Newell MD on 2022      PRIOR TREATMENT: 7000 cGy in 28 fractions to the prostate and SV     TIME SINCE TREATMENT: 24 months     RECENT HISTORY: Placido Bhatia returns for follow up status post completion of definitive radiation therapy for the above diagnosis.  He returns for formal follow-up approximately 2 years after completing radiation therapy.  He notes that his IPSS score today is 5 with biggest complaint being urgency and mild increase in frequency.  He is on finasteride and tolerating it well.  He does note some stool urgency but denies any blood or diarrhea.  He otherwise denies any other complaints at this time.    On 2024 his PSA was 0.48 slightly up from prior PSA of 0.31 from 2023.     Past medical, surgical, social and family histories reviewed and updated as indicated    ALLERGIES:  Pcn [penicillins]       MEDICATIONS:   Current Outpatient Medications:     Semaglutide (RYBELSUS) 7 MG TABS, Take 7 mg by mouth daily, Disp: , Rfl:     empagliflozin (JARDIANCE) 10 MG tablet, Take 1 tablet by mouth daily, Disp: , Rfl:     Cholecalciferol (VITAMIN D3) 50 MCG (2000) CAPS, Take 1 capsule by mouth daily, Disp: , Rfl:     Icosapent Ethyl (VASCEPA) 1 g CAPS capsule, Take 2 capsules by mouth 2

## 2025-07-10 DIAGNOSIS — C61 PROSTATE CANCER (HCC): ICD-10-CM

## 2025-07-10 LAB — PSA SERPL-MCNC: 0.23 NG/ML (ref 0–4)

## 2025-08-07 ENCOUNTER — HOSPITAL ENCOUNTER (OUTPATIENT)
Dept: RADIATION ONCOLOGY | Age: 80
Discharge: HOME | End: 2025-08-07
Payer: MEDICARE

## 2025-08-07 VITALS
TEMPERATURE: 97.2 F | OXYGEN SATURATION: 99 % | HEART RATE: 63 BPM | RESPIRATION RATE: 18 BRPM | DIASTOLIC BLOOD PRESSURE: 78 MMHG | WEIGHT: 159 LBS | BODY MASS INDEX: 24.18 KG/M2 | SYSTOLIC BLOOD PRESSURE: 123 MMHG

## 2025-08-07 DIAGNOSIS — Z12.11 ENCOUNTER FOR SCREENING COLONOSCOPY: ICD-10-CM

## 2025-08-07 DIAGNOSIS — C61 PROSTATE CANCER (HCC): Primary | ICD-10-CM

## 2025-08-07 PROCEDURE — 99212 OFFICE O/P EST SF 10 MIN: CPT | Performed by: RADIOLOGY

## 2025-08-07 RX ORDER — ACETAMINOPHEN 500 MG
500 TABLET ORAL EVERY 6 HOURS PRN
COMMUNITY

## 2025-08-08 ENCOUNTER — OFFICE VISIT (OUTPATIENT)
Dept: GASTROENTEROLOGY | Age: 80
End: 2025-08-08
Payer: MEDICARE

## 2025-08-08 VITALS
SYSTOLIC BLOOD PRESSURE: 114 MMHG | BODY MASS INDEX: 24.1 KG/M2 | WEIGHT: 159 LBS | HEIGHT: 68 IN | DIASTOLIC BLOOD PRESSURE: 60 MMHG

## 2025-08-08 DIAGNOSIS — R15.9 INCONTINENCE OF FECES, UNSPECIFIED FECAL INCONTINENCE TYPE: Primary | ICD-10-CM

## 2025-08-08 DIAGNOSIS — R19.4 RECENT CHANGE IN FREQUENCY OF BOWEL MOVEMENTS: ICD-10-CM

## 2025-08-08 PROCEDURE — G8427 DOCREV CUR MEDS BY ELIG CLIN: HCPCS | Performed by: INTERNAL MEDICINE

## 2025-08-08 PROCEDURE — 1036F TOBACCO NON-USER: CPT | Performed by: INTERNAL MEDICINE

## 2025-08-08 PROCEDURE — 1123F ACP DISCUSS/DSCN MKR DOCD: CPT | Performed by: INTERNAL MEDICINE

## 2025-08-08 PROCEDURE — 1159F MED LIST DOCD IN RCRD: CPT | Performed by: INTERNAL MEDICINE

## 2025-08-08 PROCEDURE — 99204 OFFICE O/P NEW MOD 45 MIN: CPT | Performed by: INTERNAL MEDICINE

## 2025-08-08 PROCEDURE — G8420 CALC BMI NORM PARAMETERS: HCPCS | Performed by: INTERNAL MEDICINE

## 2025-08-08 RX ORDER — SODIUM PICOSULFATE, MAGNESIUM OXIDE, AND ANHYDROUS CITRIC ACID 10; 3.5; 12 MG/160ML; G/160ML; G/160ML
LIQUID ORAL
Qty: 320 ML | Refills: 0 | Status: SHIPPED | OUTPATIENT
Start: 2025-08-08

## 2025-08-08 ASSESSMENT — ENCOUNTER SYMPTOMS: DIARRHEA: 1

## 2025-08-26 ENCOUNTER — ANESTHESIA (OUTPATIENT)
Dept: ENDOSCOPY | Age: 80
End: 2025-08-26
Payer: MEDICARE

## 2025-08-26 ENCOUNTER — ANESTHESIA EVENT (OUTPATIENT)
Dept: ENDOSCOPY | Age: 80
End: 2025-08-26
Payer: MEDICARE

## 2025-08-26 ENCOUNTER — HOSPITAL ENCOUNTER (OUTPATIENT)
Age: 80
Setting detail: OUTPATIENT SURGERY
Discharge: HOME | End: 2025-08-26
Attending: INTERNAL MEDICINE | Admitting: INTERNAL MEDICINE
Payer: MEDICARE

## 2025-08-26 VITALS
BODY MASS INDEX: 24.1 KG/M2 | SYSTOLIC BLOOD PRESSURE: 132 MMHG | RESPIRATION RATE: 18 BRPM | HEIGHT: 68 IN | OXYGEN SATURATION: 98 % | DIASTOLIC BLOOD PRESSURE: 78 MMHG | TEMPERATURE: 97.7 F | WEIGHT: 159 LBS | HEART RATE: 72 BPM

## 2025-08-26 DIAGNOSIS — R19.4 RECENT CHANGE IN FREQUENCY OF BOWEL MOVEMENTS: ICD-10-CM

## 2025-08-26 DIAGNOSIS — R15.9 INCONTINENCE OF FECES, UNSPECIFIED FECAL INCONTINENCE TYPE: ICD-10-CM

## 2025-08-26 PROBLEM — K63.5 POLYP OF COLON: Status: ACTIVE | Noted: 2025-08-26

## 2025-08-26 PROBLEM — K62.7 RADIATION PROCTITIS: Status: ACTIVE | Noted: 2025-08-26

## 2025-08-26 LAB
GLUCOSE BLD-MCNC: 88 MG/DL (ref 70–99)
PERFORMED ON: NORMAL

## 2025-08-26 PROCEDURE — 7100000011 HC PHASE II RECOVERY - ADDTL 15 MIN: Performed by: INTERNAL MEDICINE

## 2025-08-26 PROCEDURE — 45380 COLONOSCOPY AND BIOPSY: CPT | Performed by: INTERNAL MEDICINE

## 2025-08-26 PROCEDURE — 3609027000 HC COLONOSCOPY: Performed by: INTERNAL MEDICINE

## 2025-08-26 PROCEDURE — 88342 IMHCHEM/IMCYTCHM 1ST ANTB: CPT

## 2025-08-26 PROCEDURE — 6360000002 HC RX W HCPCS: Performed by: NURSE ANESTHETIST, CERTIFIED REGISTERED

## 2025-08-26 PROCEDURE — 3700000001 HC ADD 15 MINUTES (ANESTHESIA): Performed by: INTERNAL MEDICINE

## 2025-08-26 PROCEDURE — 88305 TISSUE EXAM BY PATHOLOGIST: CPT

## 2025-08-26 PROCEDURE — 2709999900 HC NON-CHARGEABLE SUPPLY: Performed by: INTERNAL MEDICINE

## 2025-08-26 PROCEDURE — 3700000000 HC ANESTHESIA ATTENDED CARE: Performed by: INTERNAL MEDICINE

## 2025-08-26 PROCEDURE — 7100000010 HC PHASE II RECOVERY - FIRST 15 MIN: Performed by: INTERNAL MEDICINE

## 2025-08-26 PROCEDURE — 45385 COLONOSCOPY W/LESION REMOVAL: CPT | Performed by: INTERNAL MEDICINE

## 2025-08-26 PROCEDURE — 2500000003 HC RX 250 WO HCPCS: Performed by: INTERNAL MEDICINE

## 2025-08-26 PROCEDURE — 2580000003 HC RX 258: Performed by: INTERNAL MEDICINE

## 2025-08-26 RX ORDER — PROPOFOL 10 MG/ML
INJECTION, EMULSION INTRAVENOUS
Status: DISCONTINUED | OUTPATIENT
Start: 2025-08-26 | End: 2025-08-26 | Stop reason: SDUPTHER

## 2025-08-26 RX ORDER — SODIUM CHLORIDE 9 MG/ML
INJECTION, SOLUTION INTRAVENOUS
Status: COMPLETED
Start: 2025-08-26 | End: 2025-08-26

## 2025-08-26 RX ORDER — SODIUM CHLORIDE 9 MG/ML
INJECTION, SOLUTION INTRAVENOUS CONTINUOUS
Status: DISCONTINUED | OUTPATIENT
Start: 2025-08-26 | End: 2025-08-26 | Stop reason: HOSPADM

## 2025-08-26 RX ORDER — SODIUM CHLORIDE 0.9 % (FLUSH) 0.9 %
5-40 SYRINGE (ML) INJECTION EVERY 12 HOURS SCHEDULED
Status: DISCONTINUED | OUTPATIENT
Start: 2025-08-26 | End: 2025-08-26 | Stop reason: HOSPADM

## 2025-08-26 RX ORDER — SODIUM CHLORIDE 0.9 % (FLUSH) 0.9 %
5-40 SYRINGE (ML) INJECTION PRN
Status: DISCONTINUED | OUTPATIENT
Start: 2025-08-26 | End: 2025-08-26 | Stop reason: HOSPADM

## 2025-08-26 RX ORDER — SODIUM CHLORIDE 9 MG/ML
INJECTION, SOLUTION INTRAVENOUS PRN
Status: DISCONTINUED | OUTPATIENT
Start: 2025-08-26 | End: 2025-08-26 | Stop reason: HOSPADM

## 2025-08-26 RX ORDER — GLYCOPYRROLATE 0.2 MG/ML
INJECTION INTRAMUSCULAR; INTRAVENOUS
Status: DISCONTINUED | OUTPATIENT
Start: 2025-08-26 | End: 2025-08-26 | Stop reason: SDUPTHER

## 2025-08-26 RX ADMIN — PROPOFOL 50 MG: 10 INJECTION, EMULSION INTRAVENOUS at 09:39

## 2025-08-26 RX ADMIN — PROPOFOL 50 MG: 10 INJECTION, EMULSION INTRAVENOUS at 09:44

## 2025-08-26 RX ADMIN — GLYCOPYRROLATE 0.2 MG: 0.2 INJECTION INTRAMUSCULAR; INTRAVENOUS at 09:33

## 2025-08-26 RX ADMIN — PROPOFOL 50 MG: 10 INJECTION, EMULSION INTRAVENOUS at 09:49

## 2025-08-26 RX ADMIN — PROPOFOL 50 MG: 10 INJECTION, EMULSION INTRAVENOUS at 09:35

## 2025-08-26 RX ADMIN — PROPOFOL 100 MG: 10 INJECTION, EMULSION INTRAVENOUS at 09:33

## 2025-08-26 RX ADMIN — SODIUM CHLORIDE: 9 INJECTION, SOLUTION INTRAVENOUS at 09:30

## 2025-08-26 ASSESSMENT — PAIN DESCRIPTION - LOCATION: LOCATION: ABDOMEN

## 2025-08-26 ASSESSMENT — PAIN SCALES - GENERAL
PAINLEVEL_OUTOF10: 0
PAINLEVEL_OUTOF10: 1

## 2025-08-26 ASSESSMENT — PAIN DESCRIPTION - DESCRIPTORS: DESCRIPTORS: DISCOMFORT

## 2025-08-26 ASSESSMENT — PAIN - FUNCTIONAL ASSESSMENT: PAIN_FUNCTIONAL_ASSESSMENT: 0-10

## 2025-08-26 ASSESSMENT — PAIN DESCRIPTION - ORIENTATION: ORIENTATION: MID

## (undated) DEVICE — TUBE SET 96 MM 64 MM H2O PERISTALTIC STD AUX CHANNEL

## (undated) DEVICE — Device

## (undated) DEVICE — MANIFOLD SUCT SMK EVAC SGL PRT DISP NEPTUNE 2

## (undated) DEVICE — GLOVE ORANGE PI 8 1/2   MSG9085

## (undated) DEVICE — SURGICAL PROCEDURE KIT ENDOSCP CUST 883 CARRY-ON

## (undated) DEVICE — GLOVE ORTHO 8   MSG9480

## (undated) DEVICE — TRAP POLYP BALEEN

## (undated) DEVICE — FORCEPS BX L240CM JAW DIA2.8MM L CAP W/ NDL MIC MESH TOOTH

## (undated) DEVICE — TUBING IRRIGATION 140/160/180/190 SER GI ENDOSCP SMARTCAP

## (undated) DEVICE — BRUSH ENDO CLN L90.5IN SHTH DIA1.7MM BRIST DIA5-7MM 2-6MM

## (undated) DEVICE — TUBING SCTION CONN 1/4X10 RIB